# Patient Record
Sex: FEMALE | Race: WHITE | NOT HISPANIC OR LATINO | Employment: OTHER | URBAN - METROPOLITAN AREA
[De-identification: names, ages, dates, MRNs, and addresses within clinical notes are randomized per-mention and may not be internally consistent; named-entity substitution may affect disease eponyms.]

---

## 2019-12-02 SDOH — HEALTH STABILITY: MENTAL HEALTH: HOW OFTEN DO YOU HAVE A DRINK CONTAINING ALCOHOL?: NEVER

## 2019-12-02 ASSESSMENT — MIFFLIN-ST. JEOR: SCORE: 1339.99

## 2019-12-06 ENCOUNTER — ANESTHESIA EVENT (OUTPATIENT)
Dept: SURGERY | Facility: AMBULATORY SURGERY CENTER | Age: 34
End: 2019-12-06

## 2019-12-09 ENCOUNTER — HOSPITAL ENCOUNTER (OUTPATIENT)
Facility: AMBULATORY SURGERY CENTER | Age: 34
Discharge: HOME | End: 2019-12-09
Attending: PLASTIC SURGERY | Admitting: PLASTIC SURGERY

## 2019-12-09 ENCOUNTER — ANESTHESIA (OUTPATIENT)
Dept: SURGERY | Facility: AMBULATORY SURGERY CENTER | Age: 34
End: 2019-12-09

## 2019-12-09 VITALS
HEART RATE: 98 BPM | WEIGHT: 135 LBS | DIASTOLIC BLOOD PRESSURE: 78 MMHG | OXYGEN SATURATION: 100 % | TEMPERATURE: 97.4 F | HEIGHT: 67 IN | BODY MASS INDEX: 21.19 KG/M2 | SYSTOLIC BLOOD PRESSURE: 126 MMHG | RESPIRATION RATE: 11 BRPM

## 2019-12-09 DIAGNOSIS — R52 PAIN: Primary | ICD-10-CM

## 2019-12-09 DIAGNOSIS — R11.0 NAUSEA AFTER ANESTHESIA, INITIAL ENCOUNTER: ICD-10-CM

## 2019-12-09 DIAGNOSIS — B99.9 INFECTION: ICD-10-CM

## 2019-12-09 DIAGNOSIS — T88.59XA NAUSEA AFTER ANESTHESIA, INITIAL ENCOUNTER: ICD-10-CM

## 2019-12-09 LAB — HCG UR QL: NEGATIVE

## 2019-12-09 PROCEDURE — 81025 URINE PREGNANCY TEST: CPT | Performed by: ANESTHESIOLOGY

## 2019-12-09 PROCEDURE — 36000140 ZZH SURGERY LEVEL COSMETIC 120 MIN

## 2019-12-09 PROCEDURE — G8916 PT W IV AB GIVEN ON TIME: HCPCS

## 2019-12-09 PROCEDURE — L8600 IMPLANT BREAST SILICONE/EQ: HCPCS

## 2019-12-09 PROCEDURE — G8907 PT DOC NO EVENTS ON DISCHARG: HCPCS

## 2019-12-09 DEVICE — IMPLANTABLE DEVICE: Type: IMPLANTABLE DEVICE | Site: BREAST | Status: FUNCTIONAL

## 2019-12-09 RX ORDER — PROPOFOL 10 MG/ML
INJECTION, EMULSION INTRAVENOUS CONTINUOUS PRN
Status: DISCONTINUED | OUTPATIENT
Start: 2019-12-09 | End: 2019-12-09

## 2019-12-09 RX ORDER — CEFAZOLIN SODIUM 2 G/100ML
2 INJECTION, SOLUTION INTRAVENOUS
Status: COMPLETED | OUTPATIENT
Start: 2019-12-09 | End: 2019-12-09

## 2019-12-09 RX ORDER — ONDANSETRON 4 MG/1
8 TABLET, ORALLY DISINTEGRATING ORAL EVERY 8 HOURS PRN
Qty: 4 TABLET | Refills: 0
Start: 2019-12-09

## 2019-12-09 RX ORDER — FENTANYL CITRATE 50 UG/ML
INJECTION, SOLUTION INTRAMUSCULAR; INTRAVENOUS PRN
Status: DISCONTINUED | OUTPATIENT
Start: 2019-12-09 | End: 2019-12-09

## 2019-12-09 RX ORDER — OXYCODONE AND ACETAMINOPHEN 5; 325 MG/1; MG/1
2 TABLET ORAL
Status: DISCONTINUED | OUTPATIENT
Start: 2019-12-09 | End: 2019-12-10 | Stop reason: HOSPADM

## 2019-12-09 RX ORDER — SODIUM CHLORIDE, SODIUM LACTATE, POTASSIUM CHLORIDE, CALCIUM CHLORIDE 600; 310; 30; 20 MG/100ML; MG/100ML; MG/100ML; MG/100ML
INJECTION, SOLUTION INTRAVENOUS CONTINUOUS
Status: DISCONTINUED | OUTPATIENT
Start: 2019-12-09 | End: 2019-12-10 | Stop reason: HOSPADM

## 2019-12-09 RX ORDER — OXYCODONE HYDROCHLORIDE 5 MG/1
5 TABLET ORAL EVERY 4 HOURS PRN
Status: DISCONTINUED | OUTPATIENT
Start: 2019-12-09 | End: 2019-12-10 | Stop reason: HOSPADM

## 2019-12-09 RX ORDER — ONDANSETRON 4 MG/1
4 TABLET, ORALLY DISINTEGRATING ORAL
Status: DISCONTINUED | OUTPATIENT
Start: 2019-12-09 | End: 2019-12-10 | Stop reason: HOSPADM

## 2019-12-09 RX ORDER — HYDROXYZINE PAMOATE 25 MG/1
25 CAPSULE ORAL EVERY 6 HOURS PRN
Qty: 30 CAPSULE | Refills: 0
Start: 2019-12-09

## 2019-12-09 RX ORDER — ONDANSETRON 2 MG/ML
INJECTION INTRAMUSCULAR; INTRAVENOUS PRN
Status: DISCONTINUED | OUTPATIENT
Start: 2019-12-09 | End: 2019-12-09

## 2019-12-09 RX ORDER — FENTANYL CITRATE 50 UG/ML
25-50 INJECTION, SOLUTION INTRAMUSCULAR; INTRAVENOUS
Status: DISCONTINUED | OUTPATIENT
Start: 2019-12-09 | End: 2019-12-10 | Stop reason: HOSPADM

## 2019-12-09 RX ORDER — ONDANSETRON 4 MG/1
4 TABLET, ORALLY DISINTEGRATING ORAL EVERY 30 MIN PRN
Status: DISCONTINUED | OUTPATIENT
Start: 2019-12-09 | End: 2019-12-10 | Stop reason: HOSPADM

## 2019-12-09 RX ORDER — ACETAMINOPHEN 325 MG/1
975 TABLET ORAL ONCE
Status: COMPLETED | OUTPATIENT
Start: 2019-12-09 | End: 2019-12-09

## 2019-12-09 RX ORDER — ONDANSETRON 2 MG/ML
4 INJECTION INTRAMUSCULAR; INTRAVENOUS EVERY 30 MIN PRN
Status: DISCONTINUED | OUTPATIENT
Start: 2019-12-09 | End: 2019-12-10 | Stop reason: HOSPADM

## 2019-12-09 RX ORDER — BUPIVACAINE HYDROCHLORIDE AND EPINEPHRINE 2.5; 5 MG/ML; UG/ML
INJECTION, SOLUTION INFILTRATION; PERINEURAL PRN
Status: DISCONTINUED | OUTPATIENT
Start: 2019-12-09 | End: 2019-12-09 | Stop reason: HOSPADM

## 2019-12-09 RX ORDER — DEXAMETHASONE SODIUM PHOSPHATE 4 MG/ML
10 INJECTION, SOLUTION INTRA-ARTICULAR; INTRALESIONAL; INTRAMUSCULAR; INTRAVENOUS; SOFT TISSUE
Status: COMPLETED | OUTPATIENT
Start: 2019-12-09 | End: 2019-12-09

## 2019-12-09 RX ORDER — DEXAMETHASONE SODIUM PHOSPHATE 4 MG/ML
INJECTION, SOLUTION INTRA-ARTICULAR; INTRALESIONAL; INTRAMUSCULAR; INTRAVENOUS; SOFT TISSUE PRN
Status: DISCONTINUED | OUTPATIENT
Start: 2019-12-09 | End: 2019-12-09

## 2019-12-09 RX ORDER — CEPHALEXIN 500 MG/1
500 CAPSULE ORAL 2 TIMES DAILY
Qty: 28 CAPSULE | Refills: 0
Start: 2019-12-09 | End: 2019-12-16

## 2019-12-09 RX ORDER — OXYCODONE AND ACETAMINOPHEN 5; 325 MG/1; MG/1
1-2 TABLET ORAL EVERY 4 HOURS PRN
Qty: 16 TABLET | Refills: 0
Start: 2019-12-09

## 2019-12-09 RX ORDER — GABAPENTIN 300 MG/1
300 CAPSULE ORAL ONCE
Status: COMPLETED | OUTPATIENT
Start: 2019-12-09 | End: 2019-12-09

## 2019-12-09 RX ORDER — HYDROMORPHONE HYDROCHLORIDE 1 MG/ML
.3-.5 INJECTION, SOLUTION INTRAMUSCULAR; INTRAVENOUS; SUBCUTANEOUS EVERY 10 MIN PRN
Status: DISCONTINUED | OUTPATIENT
Start: 2019-12-09 | End: 2019-12-10 | Stop reason: HOSPADM

## 2019-12-09 RX ORDER — DIAZEPAM 10 MG
10 TABLET ORAL EVERY 12 HOURS PRN
Status: DISCONTINUED | OUTPATIENT
Start: 2019-12-09 | End: 2019-12-10 | Stop reason: HOSPADM

## 2019-12-09 RX ORDER — LIDOCAINE HYDROCHLORIDE 20 MG/ML
INJECTION, SOLUTION INFILTRATION; PERINEURAL PRN
Status: DISCONTINUED | OUTPATIENT
Start: 2019-12-09 | End: 2019-12-09

## 2019-12-09 RX ORDER — PROPOFOL 10 MG/ML
INJECTION, EMULSION INTRAVENOUS PRN
Status: DISCONTINUED | OUTPATIENT
Start: 2019-12-09 | End: 2019-12-09

## 2019-12-09 RX ORDER — MEPERIDINE HYDROCHLORIDE 25 MG/ML
12.5 INJECTION INTRAMUSCULAR; INTRAVENOUS; SUBCUTANEOUS
Status: DISCONTINUED | OUTPATIENT
Start: 2019-12-09 | End: 2019-12-10 | Stop reason: HOSPADM

## 2019-12-09 RX ORDER — NALOXONE HYDROCHLORIDE 0.4 MG/ML
.1-.4 INJECTION, SOLUTION INTRAMUSCULAR; INTRAVENOUS; SUBCUTANEOUS
Status: DISCONTINUED | OUTPATIENT
Start: 2019-12-09 | End: 2019-12-10 | Stop reason: HOSPADM

## 2019-12-09 RX ORDER — HYDROXYZINE HYDROCHLORIDE 25 MG/1
25 TABLET, FILM COATED ORAL
Status: DISCONTINUED | OUTPATIENT
Start: 2019-12-09 | End: 2019-12-10 | Stop reason: HOSPADM

## 2019-12-09 RX ORDER — LIDOCAINE 40 MG/G
CREAM TOPICAL
Status: DISCONTINUED | OUTPATIENT
Start: 2019-12-09 | End: 2019-12-10 | Stop reason: HOSPADM

## 2019-12-09 RX ADMIN — GABAPENTIN 300 MG: 300 CAPSULE ORAL at 13:03

## 2019-12-09 RX ADMIN — CEFAZOLIN SODIUM 2 G: 2 INJECTION, SOLUTION INTRAVENOUS at 13:39

## 2019-12-09 RX ADMIN — FENTANYL CITRATE 50 MCG: 50 INJECTION, SOLUTION INTRAMUSCULAR; INTRAVENOUS at 13:51

## 2019-12-09 RX ADMIN — SODIUM CHLORIDE, SODIUM LACTATE, POTASSIUM CHLORIDE, CALCIUM CHLORIDE: 600; 310; 30; 20 INJECTION, SOLUTION INTRAVENOUS at 15:07

## 2019-12-09 RX ADMIN — DEXAMETHASONE SODIUM PHOSPHATE 10 MG: 4 INJECTION, SOLUTION INTRA-ARTICULAR; INTRALESIONAL; INTRAMUSCULAR; INTRAVENOUS; SOFT TISSUE at 13:38

## 2019-12-09 RX ADMIN — FENTANYL CITRATE 50 MCG: 50 INJECTION, SOLUTION INTRAMUSCULAR; INTRAVENOUS at 16:26

## 2019-12-09 RX ADMIN — FENTANYL CITRATE 25 MCG: 50 INJECTION, SOLUTION INTRAMUSCULAR; INTRAVENOUS at 15:26

## 2019-12-09 RX ADMIN — ONDANSETRON 4 MG: 2 INJECTION INTRAMUSCULAR; INTRAVENOUS at 15:51

## 2019-12-09 RX ADMIN — OXYCODONE HYDROCHLORIDE 5 MG: 5 TABLET ORAL at 16:28

## 2019-12-09 RX ADMIN — ACETAMINOPHEN 975 MG: 325 TABLET ORAL at 13:03

## 2019-12-09 RX ADMIN — PROPOFOL 50 MG: 10 INJECTION, EMULSION INTRAVENOUS at 13:40

## 2019-12-09 RX ADMIN — PROPOFOL 200 MCG/KG/MIN: 10 INJECTION, EMULSION INTRAVENOUS at 13:38

## 2019-12-09 RX ADMIN — FENTANYL CITRATE 25 MCG: 50 INJECTION, SOLUTION INTRAMUSCULAR; INTRAVENOUS at 13:43

## 2019-12-09 RX ADMIN — LIDOCAINE HYDROCHLORIDE 60 MG: 20 INJECTION, SOLUTION INFILTRATION; PERINEURAL at 13:34

## 2019-12-09 RX ADMIN — SODIUM CHLORIDE, SODIUM LACTATE, POTASSIUM CHLORIDE, CALCIUM CHLORIDE: 600; 310; 30; 20 INJECTION, SOLUTION INTRAVENOUS at 13:27

## 2019-12-09 RX ADMIN — FENTANYL CITRATE 50 MCG: 50 INJECTION, SOLUTION INTRAMUSCULAR; INTRAVENOUS at 16:30

## 2019-12-09 RX ADMIN — CEFAZOLIN SODIUM 1 G: 2 INJECTION, SOLUTION INTRAVENOUS at 15:39

## 2019-12-09 RX ADMIN — Medication 10 MG: at 13:58

## 2019-12-09 RX ADMIN — FENTANYL CITRATE 25 MCG: 50 INJECTION, SOLUTION INTRAMUSCULAR; INTRAVENOUS at 13:34

## 2019-12-09 RX ADMIN — FENTANYL CITRATE 25 MCG: 50 INJECTION, SOLUTION INTRAMUSCULAR; INTRAVENOUS at 14:58

## 2019-12-09 RX ADMIN — FENTANYL CITRATE 25 MCG: 50 INJECTION, SOLUTION INTRAMUSCULAR; INTRAVENOUS at 14:48

## 2019-12-09 RX ADMIN — PROPOFOL 200 MG: 10 INJECTION, EMULSION INTRAVENOUS at 13:34

## 2019-12-09 RX ADMIN — FENTANYL CITRATE 25 MCG: 50 INJECTION, SOLUTION INTRAMUSCULAR; INTRAVENOUS at 15:15

## 2019-12-09 NOTE — ANESTHESIA CARE TRANSFER NOTE
Patient: Joleen Posey    Procedure(s):  AUGMENTATION, BREAST, BILATERAL  LIPOSUCTION pre axilla, bilateral    Diagnosis: * No pre-op diagnosis entered *  Diagnosis Additional Information: No value filed.    Anesthesia Type:   General     Note:  Airway :Face Mask  Patient transferred to:PACU  Handoff Report: Identifed the Patient, Identified the Reponsible Provider, Reviewed the pertinent medical history, Discussed the surgical course, Reviewed Intra-OP anesthesia mangement and issues during anesthesia, Set expectations for post-procedure period and Allowed opportunity for questions and acknowledgement of understanding      Vitals: (Last set prior to Anesthesia Care Transfer)    CRNA VITALS  12/9/2019 1530 - 12/9/2019 1600      12/9/2019             Pulse:  109    SpO2:  100 %                Electronically Signed By: GENEVIEVE Jay CRNA  December 9, 2019  4:00 PM

## 2019-12-09 NOTE — ANESTHESIA POSTPROCEDURE EVALUATION
Anesthesia POST Procedure Evaluation    Patient: Joleen Posey   MRN:     [id number] Gender:   female   Age:    34 year old :      1985        Preoperative Diagnosis: * No pre-op diagnosis entered *   Procedure(s):  AUGMENTATION, BREAST, BILATERAL  LIPOSUCTION pre axilla, bilateral   Postop Comments: No value filed.       Anesthesia Type:  Not documented  General    Reportable Event: NO     PAIN: Uncomplicated   Sign Out status: Comfortable, Well controlled pain     PONV: No PONV   Sign Out status:  No Nausea or Vomiting     Neuro/Psych: Uneventful perioperative course   Sign Out Status: Preoperative baseline; Age appropriate mentation     Airway/Resp.: Uneventful perioperative course   Sign Out Status: Non labored breathing, age appropriate RR; Resp. Status within EXPECTED Parameters     CV: Uneventful perioperative course   Sign Out status: Appropriate BP and perfusion indices; Appropriate HR/Rhythm     Disposition:   Sign Out in:  PACU  Disposition:  Phase II; Home  Recovery Course: Uneventful  Follow-Up: Not required           Last Anesthesia Record Vitals:  CRNA VITALS  2019 1530 - 2019 1620      2019             Pulse:  109    SpO2:  100 %          Last PACU Vitals:  Vitals Value Taken Time   /85 2019  4:15 PM   Temp 97.1  F (36.2  C) 2019  3:54 PM   Pulse 104 2019  4:15 PM   Resp 11 2019  4:18 PM   SpO2 100 % 2019  4:18 PM   Temp src     NIBP     Pulse     SpO2     Resp     Temp     Ht Rate     Temp 2     Vitals shown include unvalidated device data.      Electronically Signed By: Edmundo Torres MD, 2019, 4:20 PM

## 2019-12-09 NOTE — OR NURSING
I aksed pt x 2 if she had contacts in her eyes as her eyes are a very brilliant blue color she denied having contacts in x2

## 2019-12-09 NOTE — DISCHARGE INSTRUCTIONS
[facility]  Same-Day Surgery   Adult Discharge Orders & Instructions   For 24 hours after surgery  1. Get plenty of rest.  A responsible adult must stay with you for at least 24 hours after you leave the hospital.   2. Do not drive or use heavy equipment.  If you have weakness or tingling, don't drive or use heavy equipment until this feeling goes away.  3. Do not drink alcohol.  4. Avoid strenuous or risky activities.  Ask for help when climbing stairs.   5. You may feel lightheaded.  IF so, sit for a few minutes before standing.  Have someone help you get up.   6. If you have nausea (feel sick to your stomach): Drink only clear liquids such as apple juice, ginger ale, broth or 7-Up.  Rest may also help.  Be sure to drink enough fluids.  Move to a regular diet as you feel able.  7. You may have a slight fever. Call the doctor if your fever is over 100 F (37.7 C) (taken under the tongue) or lasts longer than 24 hours.  8. You may have a dry mouth, a sore throat, muscle aches or trouble sleeping.  These should go away after 24 hours.  9. Do not make important or legal decisions.   Call your doctor for any of the followin.  Signs of infection (fever, growing tenderness at the surgery site, a large amount of drainage or bleeding, severe pain, foul-smelling drainage, redness, swelling).    2. It has been over 8 to 10 hours since surgery and you are still not able to urinate (pass water).    3.  Headache for over 24 hours.      To contact Dr Raymond call:    [phone] - Day  [phone] - After hours pager

## 2019-12-09 NOTE — ANESTHESIA PREPROCEDURE EVALUATION
Anesthesia Pre-Procedure Evaluation    Patient: Joleen Posey   MRN:     [id number] Gender:   female   Age:    34 year old :      1985        Preoperative Diagnosis: * No pre-op diagnosis entered *   Procedure(s):  AUGMENTATION, BREAST, BILATERAL  LIPOSUCTION pre axilla     History reviewed. No pertinent past medical history.   History reviewed. No pertinent surgical history.       Anesthesia Evaluation     . Pt has had prior anesthetic. Type: General    No history of anesthetic complications          ROS/MED HX    ENT/Pulmonary:  - neg pulmonary ROS     Neurologic:  - neg neurologic ROS     Cardiovascular:  - neg cardiovascular ROS       METS/Exercise Tolerance:     Hematologic:  - neg hematologic  ROS       Musculoskeletal:  - neg musculoskeletal ROS       GI/Hepatic:  - neg GI/hepatic ROS       Renal/Genitourinary:  - ROS Renal section negative       Endo:  - neg endo ROS       Psychiatric:  - neg psychiatric ROS       Infectious Disease:  - neg infectious disease ROS       Malignancy:      - no malignancy   Other:    - neg other ROS                     PHYSICAL EXAM:   Mental Status/Neuro: A/A/O   Airway: Facies: Feasible  Mallampati: I  Mouth/Opening: Full  TM distance: > 6 cm  Neck ROM: Full   Respiratory: Auscultation: CTAB     Resp. Rate: Normal     Resp. Effort: Normal      CV: Rhythm: Regular  Rate: Age appropriate  Heart: Normal Sounds  Edema: None   Comments:      Dental: Normal Dentition                LABS:  CBC: No results found for: WBC, HGB, HCT, PLT  BMP: No results found for: NA, POTASSIUM, CHLORIDE, CO2, BUN, CR, GLC  COAGS: No results found for: PTT, INR, FIBR  POC:   Lab Results   Component Value Date    HCG Negative 2019     OTHER: No results found for: PH, LACT, A1C, KATHLEEN, PHOS, MAG, ALBUMIN, PROTTOTAL, ALT, AST, GGT, ALKPHOS, BILITOTAL, BILIDIRECT, LIPASE, AMYLASE, JENI, TSH, T4, T3, CRP, SED     Preop Vitals    BP Readings from Last 3 Encounters:   19 109/71    Pulse  "Readings from Last 3 Encounters:   No data found for Pulse      Resp Readings from Last 3 Encounters:   12/09/19 18    SpO2 Readings from Last 3 Encounters:   12/09/19 98%      Temp Readings from Last 1 Encounters:   12/09/19 98.4  F (36.9  C)    Ht Readings from Last 1 Encounters:   12/02/19 1.702 m (5' 7\")      Wt Readings from Last 1 Encounters:   12/02/19 61.2 kg (135 lb)    Estimated body mass index is 21.14 kg/m  as calculated from the following:    Height as of this encounter: 1.702 m (5' 7\").    Weight as of this encounter: 61.2 kg (135 lb).     LDA:  Airway - Adult/Peds laryngeal mask airway (Active)   Number of days: 0        Assessment:   ASA SCORE: 1      Smoking Status:  Non-Smoker/Unknown   NPO Status: NPO Appropriate     Plan:   Anes. Type:  General   Pre-Medication: None   Induction:  IV (Standard)   Airway: LMA   Access/Monitoring: PIV   Maintenance: TIVA     Postop Plan:   Postop Pain: Opioids  Postop Sedation/Airway: Not planned  Disposition: Outpatient     PONV Management:   Adult Risk Factors: Female, Non-Smoker, Postop Opioids   Prevention: Ondansetron, Dexamethasone, No Volatiles     CONSENT: Direct conversation   Plan and risks discussed with: Patient   Blood Products: Consent Deferred (Minimal Blood Loss)                   Edmundo Torres MD  "

## 2019-12-09 NOTE — BRIEF OP NOTE
[facility] Brief Operative Note    Pre-operative diagnosis: hypomastia   Post-operative diagnosis same   Procedure: Procedure(s):  AUGMENTATION, BREAST, BILATERAL  LIPOSUCTION pre axilla   Surgeon(s): Surgeon(s) and Role:     * Chriss Musa MD - Primary   Estimated blood loss: minimal   Specimens: none   Findings: none   Assist: none  Complications: none  Condition: extubated and stable to PAR    Chriss Musa MD

## 2020-01-04 NOTE — OP NOTE
Procedure Date: 12/09/2019      PREOPERATIVE DIAGNOSES:   1.  Bilateral hypomastia.   2.  Grade 2 breast ptosis.   3.  History of breast augmentation in [place], [place] by Gavino Doshi MD fellow of the [employer], 06/30/2008, removal and replacement and enlargement of her breast implants by Gavino Doshi MD on 04/20/2019, and removal of her breast implants after saline implant deflation on 10/19/2015.      PROCEDURE:  Bilateral augmentation mammoplasty through an inframammary fold incision with implant in a submuscular position.      IMPLANTS:  Left breast implant:  Morenci 650 mL smooth round ultra-high profile Morenci MemoryGel breast implant, reference number [id number], lot number [id number], serial number [id number].   Right breast implant:  Morenci 650 mL smooth round ultra-high profile Morenci MemoryGel breast implant, reference number [id number], lot number is [id number],  serial number [id number].      SURGEON:  Chriss Musa MD      ANESTHETIC:  General utilizing a laryngeal mask airway.      INDICATIONS:  The patient is a very pleasant and attractive 34-year-old female with a complicated history of previous breast surgery.  At age 23, the patient underwent saline breast augmentation by Gavino Doshi MD, [facility], in [place], [place].  The patient sought our care in 11/2019.  Since the patient had 3 operations performed by Gavino Doshi MD from [place], [place], we obtained authorization for release of medical information from [facility].  Notes arrived for my review.  The patient's first operation dated 06/30/2018 states that Dr. Doshi performed a transaxillary subfascial breast augmentation. As a board Certified  plastic surgeon, I do not   I believe have seen this operation described in the Plastic Surgery literature.  Subfascial breast augmentations have been described.  This would be an extreme difficult operation to be performed through a transaxillary approach as I do not know the  instrumentations involved for such an operation.  In further reviewing the notes, the patient's initial breast augmentation on 06/30/2008 utilized Fluvanna smooth round moderate plus profile implants.  These with Fluvanna style 2000 implants which is a 375 mL implant and they were filled to 385 mL on the left and 380 mL right.  The patient was taken to the operating room for removal and replacement and upsizing of her breast implant on 04/20/2009.  The notes received from Dr. Doshi's office indicates that these implants are a Fluvanna smooth round high profile saline breast implant; style 3000 with a nominal fill volume of 560 mL.  They were filled to 575 mL on the left and 570 mL on the right.  The patient experienced deflation of her breast implant in the autumn of 2015.  The patient elected to have her saline breast implants removed.  This operation for removal of her saline breast implants was performed on 10/19/2015 by Gavino Doshi MD.      The patient saw us in consultation regarding breast augmentation.  She was told that she does have breast ptosis and I recommended a Benelli or periareolar augmentation mastopexy.  The patient very much desired to avoid the circumareolar incision around the nipple-areolar complex necessary for treatment of the ptosis as is custom in a Benelli augmentation mastopexy.  The patient desired to have larger breasts and opted to undergo a submuscular breast augmentation and see if she would need to lift as a secondary procedure.  I agreed that it is possible a larger breast implant may develop a skin envelope and that she may find this to be satisfactory, though I did caution her that I felt the breast lift was in her best interest.  The patient was counseled as to the advantages and disadvantages of saline versus silicone gel breast implants.  She was told that saline breast implants have the inherent advantage that the fill material being saline is resorbed should the implant ever  fail.  Saline breast implants do not require MRI surveillance.  Saline breast implants also have a slightly lower rate of capsular contracture compared with silicone gel breast implants.  The patient understands that silicone gel breast implants have a lessened tendency towards rippling and wrinkling compared to saline breast implants due to the gel nature of the fill material and its viscosity.  There is a phenomenon with silicone gel breast implants known as silent rupture where the implant may fail and neither the surgeon nor patient are aware of this.  I recommend MRI surveillance for silent rupture at 8-10 years postoperatively.  The patient understands that breast implants are not a lifetime medical device and she can expect to have them replaced within her lifetime.  The current generation of Sientra HSC gel breast implants do carry a lifetime warranty.      The patient understands there is a phenomenon of capsular contracture where the body may form an aggressive scar capsule around the breast implant.  Capsular contracture in my practice is between 2% and 3% with implants placed in a submuscular position through an inframammary fold incision as would be the case in this patient.  The patient was told that there are 4 maneuvers I would utilize to diminish her chance of capsular contracture.  The first is submuscular breast implant placement.  Submuscular breast implant placement confers a 3-4-fold decrease in rate of capsular contracture compared with subglandular breast augmentation.  Subglandular breast augmentation carries a capsular contracture in the 15% to 19% and this can be lowered to the 3% to 4% range when placing the implant in a submuscular position.  Use of inframammary fold incision for placement of the breast implant has been shown to confer a 10-fold reduction of capsular contracture compared with the use of a periareolar incision.  This was shown in clinical studies performed by Maged  MD Janet from [facility].  In his study, the rate of capsular contracture through an inframammary fold incision was 0.59% where his capsular contracture rate was 9.5% when a periareolar incision was used.  The use of breast implant pocket irrigations consisting of Ancef, gentamicin, bacitracin has been shown to decrease capsule contracture in clinical studies performed by Pierre Pérez MD from [facility].  Last maneuver I utilize to diminish the chance of capsular contracture is the use of an implant that is iNPLANT funnel for implant placement.  The funnel allows for no-touch technique which theoretically should decrease the chance of developing biofilm and, therefore, capsular contracture.  The patient understands that she should tell her mammographer she has breast implants and age appropriate.  Special mammographic views known as Malik views are used to best visualize the breasts following augmentation mammoplasty.  The patient was told there is a condition known as breast implant associated lymphoma.  Between 4 and 5 hundred cases have been reported in the worldwide literature to date and every case has been shown to occur where patients and surgeons have chosen to use a textured implant.  In my practice dating to 1998, I have used only smooth implants in my practice.  No cases of breast implant associated lymphoma have occurred with the use of smoothed breast implants.      The patient understands that her incision will be inframammary fold.  She understands the risks of the operation include chance of capsule contracture, followed by implant malposition, asymmetry, hypertrophic scarring, and possible dissatisfaction with cosmetic outcome.  Other complications such as bleeding and infection are extremely rare in my practice.  I had 1 postoperative bleed and 1 single infection dating to 1994.  The patient was given the chance to ask questions and all were answered.   The patient provides her informed consent utilizing the [facility] consent form as well as our in-office consent form.      DESCRIPTION OF PROCEDURE AND FINDINGS:  In the preoperative holding area, the inframammary fold was marked and consent was obtained.  This consent is in addition to the consent obtained in our office at her preoperative evaluation.  The patient was taken to the operating room, placed in supine position on operating table.  A successful general anesthetic was then induced using a laryngeal mask airway.  The patient received 2 grams of Ancef and 10 mg of Decadron intravenously prior to commencing with surgery.  The patient's chest was then prepped and draped in routine sterile fashion.  The nipple-areolar complexes were covered with 3M Tegaderm dressing. A time-out was called at 1:51 p.m.  Incision was made at 1:52 p.m.      A 4.5 cm incision was made in the right inframammary fold and through this incision, dissection was carried down to the pectoralis muscle.  I dissected several centimeters above the inframammary fold on the clavipectoral fascia.  Then, utilizing a Nikolai retractor, I established a submuscular plane.  The muscle was partially released from 3 o'clock to 6 o'clock on the right side.  Intercostal perforating vessels were cauterized using the insulated bayonet style DeBakey type monopolar forceps as I came upon them.  Finger dissection was used laterally to avoid any traction of the fourth intercostal nerve.  Superiorly, dissection was made between the pectoralis major and pectoralis minor muscles using a uterine sound and gentle sweeping fashion.  A sizer was then placed and filled with air to 650 mL.  This gave nice shape to her breast and identified areas where further attenuation of medial fibers of pectoralis major was needed and this was accomplished surgically.  Right breast pocket was then irrigated with solution  consisting of 1 gram of Ancef, 80 mg of gentamicin and bacitracin and normal saline.  This was done in accordance with studies by Pierre Pérez MD from [facility] as a clinically proven means of diminishing the chance of capsular contracture.  The dissection pocket was then injected with 0.25% Marcaine and Kenalog for postoperative analgesia and for the anti-inflammatory properties of steroids.      Attention was then directed to the left side where the exact operation was performed.  On the left side, the muscle was partially released from 6 o'clock to 9 o'clock.  Again, a sizer was placed and filled with 650 mL of air which gave nice shape to her breast.  This also identified areas where further attenuation of the pectoralis major was needed and this was accomplished surgically.  The Ancef, gentamicin, bacitracin solution was then irrigated into left breast pocket as well.  I then injected the dissection pocket with 0.25% Marcaine and Kenalog to diminish postoperative discomfort and for the anti-inflammatory properties of steroids.      Empire 650 mL smooth round ultra-high profile Empire MemoryGel breast implants were chosen.  Their reference numbers and serial numbers can be found at the beginning of this operative report.  They were opened on the back table.  The implants were soaked in the Ancef, gentamicin and bacitracin solution.  At this point, all operative personnel changed their gloves.  An iNPLANT funnel was opened and the lubricant dispersed.  I then added the Ancef, gentamicin, bacitracin solution to the funnel.  The implant was then transferred from its sterile container to the funnel on the back table.  The zipper of the funnel was then zipped shut and trimmed to the appropriate size for a 600-650 mL implant.  The implant was then inserted into the right breast pocket utilizing a no-touch technique.  Closure consisted of 3-0 Vicryl sutures in the deep muscular layer.  Deep  subcutaneous tissue was closed using 4-0 PDS suture in buried interrupted fashion.  Deep dermal sutures were placed using 4-0 Monocryl suture in buried and interrupted fashion.  The skin was then run using 4-0 Prolene suture in running and buried continuous intracuticular fashion.  6-0 Prolene sutures were placed at each end of the incision for exact coaptation of the skin.      Attention was directed to the left side where the identical implant insertion was performed.  Once again, all operative personnel changed their gloves.  The iNPLANT funnel was then lubricated using  the antibiotic solution and Techni-Care.  The Vida 650 mL ultra-high profile implant was then transferred from its sterile container to the iNPLANT funnel.  The zipper was closed and the funnel was utilized to place the implant utilizing a no-touch technique.  Closure was identical from left to right.  Dressing consisted of Mastisol and 3M half-inch Steri-Strips over the inframammary fold incisions followed by a surgical bra.      ESTIMATED BLOOD LOSS:  Minimal.      COMPLICATIONS:  None.      CONDITION:  Stable to postanesthesia recovery.      OPERATIVE TIMES:  Total scheduled operating room time 2 hours and 30 minutes which equals 2 hours of paid surgical operative time plus 30 minutes of anesthesia/room time.  Actual total operating room time 2 hours and 26 minutes which equals 1 hour and 56 minutes of actual surgical operative time plus 30 minutes of anesthesia/room time.  It should be noted that the operation was 4 minutes underpaid surgical operative time.         ELIEL DALE MD             D: 2020   T: 2020   MT: [id number]      Name:     RADHA MONTERROSO   MRN:      [id number]        Account:        [id number]   :      1985           Procedure Date: 2019      Document: [id number]

## 2020-03-11 ENCOUNTER — HOSPITAL ENCOUNTER (OUTPATIENT)
Facility: AMBULATORY SURGERY CENTER | Age: 35
End: 2020-03-11
Attending: PLASTIC SURGERY | Admitting: PLASTIC SURGERY

## 2020-06-15 DIAGNOSIS — Z11.59 ENCOUNTER FOR SCREENING FOR OTHER VIRAL DISEASES: Primary | ICD-10-CM

## 2021-07-13 DIAGNOSIS — Z11.59 ENCOUNTER FOR SCREENING FOR OTHER VIRAL DISEASES: ICD-10-CM

## 2021-08-10 ENCOUNTER — LAB (OUTPATIENT)
Dept: URGENT CARE | Facility: URGENT CARE | Age: 36
End: 2021-08-10
Attending: PLASTIC SURGERY
Payer: COMMERCIAL

## 2021-08-10 PROCEDURE — U0003 INFECTIOUS AGENT DETECTION BY NUCLEIC ACID (DNA OR RNA); SEVERE ACUTE RESPIRATORY SYNDROME CORONAVIRUS 2 (SARS-COV-2) (CORONAVIRUS DISEASE [COVID-19]), AMPLIFIED PROBE TECHNIQUE, MAKING USE OF HIGH THROUGHPUT TECHNOLOGIES AS DESCRIBED BY CMS-2020-01-R: HCPCS | Performed by: PLASTIC SURGERY

## 2021-08-11 ENCOUNTER — ANESTHESIA EVENT (OUTPATIENT)
Dept: SURGERY | Facility: AMBULATORY SURGERY CENTER | Age: 36
End: 2021-08-11

## 2021-08-12 ENCOUNTER — HOSPITAL ENCOUNTER (OUTPATIENT)
Facility: AMBULATORY SURGERY CENTER | Age: 36
End: 2021-08-12
Attending: PLASTIC SURGERY | Admitting: PLASTIC SURGERY
Payer: COMMERCIAL

## 2021-08-12 ENCOUNTER — ANESTHESIA (OUTPATIENT)
Dept: SURGERY | Facility: AMBULATORY SURGERY CENTER | Age: 36
End: 2021-08-12

## 2021-08-12 VITALS
TEMPERATURE: 97.6 F | OXYGEN SATURATION: 100 % | SYSTOLIC BLOOD PRESSURE: 100 MMHG | HEIGHT: 67 IN | BODY MASS INDEX: 21.18 KG/M2 | DIASTOLIC BLOOD PRESSURE: 52 MMHG | WEIGHT: 134.92 LBS | RESPIRATION RATE: 16 BRPM

## 2021-08-12 DIAGNOSIS — T88.59XA NAUSEA AFTER ANESTHESIA, INITIAL ENCOUNTER: ICD-10-CM

## 2021-08-12 DIAGNOSIS — R52 PAIN: Primary | ICD-10-CM

## 2021-08-12 DIAGNOSIS — R11.0 NAUSEA AFTER ANESTHESIA, INITIAL ENCOUNTER: ICD-10-CM

## 2021-08-12 LAB — HCG UR QL: NEGATIVE

## 2021-08-12 PROCEDURE — 360N000057 HC SURGERY LEVEL COSMETIC 420 MIN

## 2021-08-12 PROCEDURE — G8907 PT DOC NO EVENTS ON DISCHARG: HCPCS

## 2021-08-12 PROCEDURE — 81025 URINE PREGNANCY TEST: CPT | Performed by: ANESTHESIOLOGY

## 2021-08-12 PROCEDURE — G8916 PT W IV AB GIVEN ON TIME: HCPCS

## 2021-08-12 PROCEDURE — 360N000058 HC SURGERY LEVEL COSMETIC EACH ADDL 30 MIN OVER QUOTE ESTIMATE

## 2021-08-12 PROCEDURE — 88305 TISSUE EXAM BY PATHOLOGIST: CPT | Mod: 59 | Performed by: PATHOLOGY

## 2021-08-12 RX ORDER — OXYCODONE AND ACETAMINOPHEN 5; 325 MG/1; MG/1
2 TABLET ORAL
Status: DISCONTINUED | OUTPATIENT
Start: 2021-08-12 | End: 2021-08-13 | Stop reason: HOSPADM

## 2021-08-12 RX ORDER — DIAZEPAM 10 MG/2ML
2.5 INJECTION, SOLUTION INTRAMUSCULAR; INTRAVENOUS
Status: DISCONTINUED | OUTPATIENT
Start: 2021-08-12 | End: 2021-08-13 | Stop reason: HOSPADM

## 2021-08-12 RX ORDER — ACETAMINOPHEN 325 MG/1
975 TABLET ORAL ONCE
Status: COMPLETED | OUTPATIENT
Start: 2021-08-12 | End: 2021-08-12

## 2021-08-12 RX ORDER — CEFAZOLIN SODIUM 2 G/100ML
2 INJECTION, SOLUTION INTRAVENOUS SEE ADMIN INSTRUCTIONS
Status: DISCONTINUED | OUTPATIENT
Start: 2021-08-12 | End: 2021-08-13 | Stop reason: HOSPADM

## 2021-08-12 RX ORDER — OXYCODONE HYDROCHLORIDE 5 MG/1
5-10 TABLET ORAL EVERY 4 HOURS PRN
Status: DISCONTINUED | OUTPATIENT
Start: 2021-08-12 | End: 2021-08-13 | Stop reason: HOSPADM

## 2021-08-12 RX ORDER — HYDROXYZINE HYDROCHLORIDE 25 MG/1
25 TABLET, FILM COATED ORAL
Status: DISCONTINUED | OUTPATIENT
Start: 2021-08-12 | End: 2021-08-13 | Stop reason: HOSPADM

## 2021-08-12 RX ORDER — SODIUM CHLORIDE, SODIUM LACTATE, POTASSIUM CHLORIDE, CALCIUM CHLORIDE 600; 310; 30; 20 MG/100ML; MG/100ML; MG/100ML; MG/100ML
INJECTION, SOLUTION INTRAVENOUS CONTINUOUS
Status: DISCONTINUED | OUTPATIENT
Start: 2021-08-12 | End: 2021-08-13 | Stop reason: HOSPADM

## 2021-08-12 RX ORDER — CEFAZOLIN SODIUM 2 G/100ML
2 INJECTION, SOLUTION INTRAVENOUS
Status: COMPLETED | OUTPATIENT
Start: 2021-08-12 | End: 2021-08-12

## 2021-08-12 RX ORDER — LIDOCAINE HYDROCHLORIDE 20 MG/ML
INJECTION, SOLUTION INFILTRATION; PERINEURAL PRN
Status: DISCONTINUED | OUTPATIENT
Start: 2021-08-12 | End: 2021-08-12

## 2021-08-12 RX ORDER — KETOROLAC TROMETHAMINE 30 MG/ML
30 INJECTION, SOLUTION INTRAMUSCULAR; INTRAVENOUS EVERY 6 HOURS PRN
Status: DISCONTINUED | OUTPATIENT
Start: 2021-08-12 | End: 2021-08-13 | Stop reason: HOSPADM

## 2021-08-12 RX ORDER — FENTANYL CITRATE 50 UG/ML
INJECTION, SOLUTION INTRAMUSCULAR; INTRAVENOUS PRN
Status: DISCONTINUED | OUTPATIENT
Start: 2021-08-12 | End: 2021-08-12

## 2021-08-12 RX ORDER — DEXAMETHASONE SODIUM PHOSPHATE 4 MG/ML
10 INJECTION, SOLUTION INTRA-ARTICULAR; INTRALESIONAL; INTRAMUSCULAR; INTRAVENOUS; SOFT TISSUE
Status: COMPLETED | OUTPATIENT
Start: 2021-08-12 | End: 2021-08-12

## 2021-08-12 RX ORDER — ONDANSETRON 2 MG/ML
4 INJECTION INTRAMUSCULAR; INTRAVENOUS EVERY 30 MIN PRN
Status: DISCONTINUED | OUTPATIENT
Start: 2021-08-12 | End: 2021-08-13 | Stop reason: HOSPADM

## 2021-08-12 RX ORDER — PROPOFOL 10 MG/ML
INJECTION, EMULSION INTRAVENOUS PRN
Status: DISCONTINUED | OUTPATIENT
Start: 2021-08-12 | End: 2021-08-12

## 2021-08-12 RX ORDER — CEFADROXIL 500 MG/1
500 CAPSULE ORAL EVERY 12 HOURS SCHEDULED
Status: DISCONTINUED | OUTPATIENT
Start: 2021-08-12 | End: 2021-08-13 | Stop reason: HOSPADM

## 2021-08-12 RX ORDER — ONDANSETRON 4 MG/1
4-8 TABLET, ORALLY DISINTEGRATING ORAL EVERY 8 HOURS PRN
Qty: 4 TABLET | Refills: 0 | Status: CANCELLED
Start: 2021-08-12

## 2021-08-12 RX ORDER — ONDANSETRON 4 MG/1
4 TABLET, ORALLY DISINTEGRATING ORAL
Status: DISCONTINUED | OUTPATIENT
Start: 2021-08-12 | End: 2021-08-13 | Stop reason: HOSPADM

## 2021-08-12 RX ORDER — FENTANYL CITRATE 50 UG/ML
25 INJECTION, SOLUTION INTRAMUSCULAR; INTRAVENOUS EVERY 5 MIN PRN
Status: DISCONTINUED | OUTPATIENT
Start: 2021-08-12 | End: 2021-08-13 | Stop reason: HOSPADM

## 2021-08-12 RX ORDER — LIDOCAINE 40 MG/G
CREAM TOPICAL
Status: DISCONTINUED | OUTPATIENT
Start: 2021-08-12 | End: 2021-08-13 | Stop reason: HOSPADM

## 2021-08-12 RX ORDER — PROPOFOL 10 MG/ML
INJECTION, EMULSION INTRAVENOUS CONTINUOUS PRN
Status: DISCONTINUED | OUTPATIENT
Start: 2021-08-12 | End: 2021-08-12

## 2021-08-12 RX ORDER — DIAZEPAM 5 MG
10 TABLET ORAL EVERY 12 HOURS PRN
Status: DISCONTINUED | OUTPATIENT
Start: 2021-08-12 | End: 2021-08-13 | Stop reason: HOSPADM

## 2021-08-12 RX ORDER — OXYCODONE AND ACETAMINOPHEN 5; 325 MG/1; MG/1
1 TABLET ORAL EVERY 4 HOURS PRN
Status: DISCONTINUED | OUTPATIENT
Start: 2021-08-12 | End: 2021-08-13 | Stop reason: HOSPADM

## 2021-08-12 RX ORDER — LABETALOL HYDROCHLORIDE 5 MG/ML
10 INJECTION, SOLUTION INTRAVENOUS
Status: DISCONTINUED | OUTPATIENT
Start: 2021-08-12 | End: 2021-08-13 | Stop reason: HOSPADM

## 2021-08-12 RX ORDER — COCAINE HYDROCHLORIDE 40 MG/ML
SOLUTION NASAL PRN
Status: DISCONTINUED | OUTPATIENT
Start: 2021-08-12 | End: 2021-08-12 | Stop reason: HOSPADM

## 2021-08-12 RX ORDER — BUPIVACAINE HYDROCHLORIDE AND EPINEPHRINE 2.5; 5 MG/ML; UG/ML
INJECTION, SOLUTION INFILTRATION; PERINEURAL PRN
Status: DISCONTINUED | OUTPATIENT
Start: 2021-08-12 | End: 2021-08-12 | Stop reason: HOSPADM

## 2021-08-12 RX ORDER — FENTANYL CITRATE 50 UG/ML
25-50 INJECTION, SOLUTION INTRAMUSCULAR; INTRAVENOUS EVERY 5 MIN PRN
Status: ACTIVE | OUTPATIENT
Start: 2021-08-12 | End: 2021-08-12

## 2021-08-12 RX ORDER — SCOLOPAMINE TRANSDERMAL SYSTEM 1 MG/1
1 PATCH, EXTENDED RELEASE TRANSDERMAL ONCE
Status: DISCONTINUED | OUTPATIENT
Start: 2021-08-12 | End: 2021-08-13 | Stop reason: HOSPADM

## 2021-08-12 RX ORDER — GINSENG 100 MG
CAPSULE ORAL PRN
Status: DISCONTINUED | OUTPATIENT
Start: 2021-08-12 | End: 2021-08-12 | Stop reason: HOSPADM

## 2021-08-12 RX ORDER — ONDANSETRON 4 MG/1
4 TABLET, ORALLY DISINTEGRATING ORAL EVERY 30 MIN PRN
Status: DISCONTINUED | OUTPATIENT
Start: 2021-08-12 | End: 2021-08-13 | Stop reason: HOSPADM

## 2021-08-12 RX ORDER — HYDROXYZINE PAMOATE 25 MG/1
25 CAPSULE ORAL EVERY 6 HOURS PRN
Qty: 30 CAPSULE | Refills: 0 | Status: CANCELLED
Start: 2021-08-12

## 2021-08-12 RX ORDER — ONDANSETRON 2 MG/ML
INJECTION INTRAMUSCULAR; INTRAVENOUS PRN
Status: DISCONTINUED | OUTPATIENT
Start: 2021-08-12 | End: 2021-08-12

## 2021-08-12 RX ORDER — ALBUTEROL SULFATE 0.83 MG/ML
2.5 SOLUTION RESPIRATORY (INHALATION) EVERY 4 HOURS PRN
Status: DISCONTINUED | OUTPATIENT
Start: 2021-08-12 | End: 2021-08-13 | Stop reason: HOSPADM

## 2021-08-12 RX ORDER — MEPERIDINE HYDROCHLORIDE 25 MG/ML
12.5 INJECTION INTRAMUSCULAR; INTRAVENOUS; SUBCUTANEOUS
Status: COMPLETED | OUTPATIENT
Start: 2021-08-12 | End: 2021-08-12

## 2021-08-12 RX ORDER — LIDOCAINE HYDROCHLORIDE AND EPINEPHRINE 10; 10 MG/ML; UG/ML
INJECTION, SOLUTION INFILTRATION; PERINEURAL PRN
Status: DISCONTINUED | OUTPATIENT
Start: 2021-08-12 | End: 2021-08-12 | Stop reason: HOSPADM

## 2021-08-12 RX ADMIN — SODIUM CHLORIDE, SODIUM LACTATE, POTASSIUM CHLORIDE, CALCIUM CHLORIDE: 600; 310; 30; 20 INJECTION, SOLUTION INTRAVENOUS at 13:21

## 2021-08-12 RX ADMIN — CEFAZOLIN SODIUM 2 G: 2 INJECTION, SOLUTION INTRAVENOUS at 07:03

## 2021-08-12 RX ADMIN — Medication 30 MG: at 07:14

## 2021-08-12 RX ADMIN — PROPOFOL 50 MG: 10 INJECTION, EMULSION INTRAVENOUS at 08:02

## 2021-08-12 RX ADMIN — FENTANYL CITRATE 25 MCG: 50 INJECTION, SOLUTION INTRAMUSCULAR; INTRAVENOUS at 15:33

## 2021-08-12 RX ADMIN — Medication 0.5 MG: at 11:36

## 2021-08-12 RX ADMIN — Medication 0.5 MG: at 10:17

## 2021-08-12 RX ADMIN — ACETAMINOPHEN 975 MG: 325 TABLET ORAL at 06:23

## 2021-08-12 RX ADMIN — ONDANSETRON 4 MG: 2 INJECTION INTRAMUSCULAR; INTRAVENOUS at 14:40

## 2021-08-12 RX ADMIN — MEPERIDINE HYDROCHLORIDE 12.5 MG: 25 INJECTION INTRAMUSCULAR; INTRAVENOUS; SUBCUTANEOUS at 15:53

## 2021-08-12 RX ADMIN — FENTANYL CITRATE 50 MCG: 50 INJECTION, SOLUTION INTRAMUSCULAR; INTRAVENOUS at 07:14

## 2021-08-12 RX ADMIN — MEPERIDINE HYDROCHLORIDE 12.5 MG: 25 INJECTION INTRAMUSCULAR; INTRAVENOUS; SUBCUTANEOUS at 15:39

## 2021-08-12 RX ADMIN — DEXAMETHASONE SODIUM PHOSPHATE 10 MG: 4 INJECTION, SOLUTION INTRA-ARTICULAR; INTRALESIONAL; INTRAMUSCULAR; INTRAVENOUS; SOFT TISSUE at 07:35

## 2021-08-12 RX ADMIN — SODIUM CHLORIDE, SODIUM LACTATE, POTASSIUM CHLORIDE, CALCIUM CHLORIDE: 600; 310; 30; 20 INJECTION, SOLUTION INTRAVENOUS at 06:57

## 2021-08-12 RX ADMIN — PROPOFOL 200 MCG/KG/MIN: 10 INJECTION, EMULSION INTRAVENOUS at 07:16

## 2021-08-12 RX ADMIN — FENTANYL CITRATE 25 MCG: 50 INJECTION, SOLUTION INTRAMUSCULAR; INTRAVENOUS at 15:52

## 2021-08-12 RX ADMIN — PROPOFOL 200 MG: 10 INJECTION, EMULSION INTRAVENOUS at 07:14

## 2021-08-12 RX ADMIN — SODIUM CHLORIDE, SODIUM LACTATE, POTASSIUM CHLORIDE, CALCIUM CHLORIDE: 600; 310; 30; 20 INJECTION, SOLUTION INTRAVENOUS at 14:58

## 2021-08-12 RX ADMIN — Medication 0.5 MG: at 13:23

## 2021-08-12 RX ADMIN — FENTANYL CITRATE 50 MCG: 50 INJECTION, SOLUTION INTRAMUSCULAR; INTRAVENOUS at 07:35

## 2021-08-12 RX ADMIN — FENTANYL CITRATE 25 MCG: 50 INJECTION, SOLUTION INTRAMUSCULAR; INTRAVENOUS at 15:58

## 2021-08-12 RX ADMIN — SCOLOPAMINE TRANSDERMAL SYSTEM 1 PATCH: 1 PATCH, EXTENDED RELEASE TRANSDERMAL at 07:06

## 2021-08-12 RX ADMIN — OXYCODONE HYDROCHLORIDE 5 MG: 5 TABLET ORAL at 15:51

## 2021-08-12 RX ADMIN — PROPOFOL 50 MG: 10 INJECTION, EMULSION INTRAVENOUS at 08:51

## 2021-08-12 RX ADMIN — CEFAZOLIN SODIUM 2 G: 2 INJECTION, SOLUTION INTRAVENOUS at 15:01

## 2021-08-12 RX ADMIN — Medication 20 MG: at 08:02

## 2021-08-12 RX ADMIN — LIDOCAINE HYDROCHLORIDE 60 MG: 20 INJECTION, SOLUTION INFILTRATION; PERINEURAL at 07:14

## 2021-08-12 RX ADMIN — CEFAZOLIN SODIUM 2 G: 2 INJECTION, SOLUTION INTRAVENOUS at 11:03

## 2021-08-12 RX ADMIN — Medication 0.5 MG: at 08:04

## 2021-08-12 RX ADMIN — FENTANYL CITRATE 25 MCG: 50 INJECTION, SOLUTION INTRAMUSCULAR; INTRAVENOUS at 15:40

## 2021-08-12 RX ADMIN — SODIUM CHLORIDE, SODIUM LACTATE, POTASSIUM CHLORIDE, CALCIUM CHLORIDE: 600; 310; 30; 20 INJECTION, SOLUTION INTRAVENOUS at 11:52

## 2021-08-12 RX ADMIN — SODIUM CHLORIDE, SODIUM LACTATE, POTASSIUM CHLORIDE, CALCIUM CHLORIDE: 600; 310; 30; 20 INJECTION, SOLUTION INTRAVENOUS at 08:32

## 2021-08-12 ASSESSMENT — MIFFLIN-ST. JEOR: SCORE: 1329.63

## 2021-08-12 NOTE — DISCHARGE INSTRUCTIONS
[facility]  Same-Day Surgery   Adult Discharge Orders & Instructions   For 24 hours after surgery  1. Get plenty of rest.  A responsible adult must stay with you for at least 24 hours after you leave the hospital.   2. Do not drive or use heavy equipment.  If you have weakness or tingling, don't drive or use heavy equipment until this feeling goes away.  3. Do not drink alcohol.  4. Avoid strenuous or risky activities.  Ask for help when climbing stairs.   5. You may feel lightheaded.  IF so, sit for a few minutes before standing.  Have someone help you get up.   6. If you have nausea (feel sick to your stomach): Drink only clear liquids such as apple juice, ginger ale, broth or 7-Up.  Rest may also help.  Be sure to drink enough fluids.  Move to a regular diet as you feel able.  7. You may have a slight fever. Call the doctor if your fever is over 100 F (37.7 C) (taken under the tongue) or lasts longer than 24 hours.  8. You may have a dry mouth, a sore throat, muscle aches or trouble sleeping.  These should go away after 24 hours.  9. Do not make important or legal decisions.   Call your doctor for any of the followin.  Signs of infection (fever, growing tenderness at the surgery site, a large amount of drainage or bleeding, severe pain, foul-smelling drainage, redness, swelling).    2. It has been over 8 to 10 hours since surgery and you are still not able to urinate (pass water).    3.  Headache for over 24 hours.    SCOPALAMINE Patch placed behind your ear for nausea relief.  Remove the patch in 24-26 hours.  Dispose in trash and wash hands carefully.     Information for Patients Discharging with a Transderm Scopolamine Patch       Dry mouth is a common side effect.    Drowsiness is another common side effect especially when combined with pain medication.  Please avoid activities that require mental alertness such as driving a car or making important legal decisions.    Since  Scopolamine can cause temporary dilation of the pupils and blurred vision if it comes in contact with the eyes; be sure to wash your hands thoroughly with soap and water immediately after handling the patch.   When you remove your patch, please stick it to a tissue or paper towel for disposal.      Remove the patch immediately and contact a physician in the unlikely event that you experience symptoms of acute glaucoma (pain and reddening of the eyes, accompanied by dilated pupils).  Remove the patch if you develop any difficulties urinating.  If you cannot urinate after removing your patch, please notify your surgeon.

## 2021-08-12 NOTE — BRIEF OP NOTE
[facility] Brief Operative Note    Pre-operative diagnosis: Breast ptosis and cosmetic nasal deformity   Post-operative diagnosis same   Procedure: Procedure(s):  RHINOPLASTY  Reaves pattern mastopexy   Surgeon(s): Surgeon(s) and Role:     * Chriss Musa MD - Primary   Estimated blood loss: 50ml    Specimens: Sent for gross pathology   Findings: none   Assist: none  Complications: none  Condition: extubated and stable to PAR    Chriss Musa MD

## 2021-08-12 NOTE — OR NURSING
Eye contacts removed in OR before induction per patient request. Placed in contact case with NAC[facility], sent to PACU  after procedure.  Arpita Patton Rn

## 2021-08-12 NOTE — ANESTHESIA PROCEDURE NOTES
Airway       Patient location during procedure: OR       Procedure Start/Stop Times: 8/12/2021 7:19 AM and 8/12/2021 7:20 AM  Staff -        CRNA: Naima Hay APRN CRNA       Performed By: anesthesiologist  Consent for Airway        Urgency: elective  Indications and Patient Condition       Indications for airway management: candida-procedural       Induction type:intravenous       Mask difficulty assessment: 2 - vent by mask + OA or adjuvant +/- NMBA    Final Airway Details       Final airway type: endotracheal airway       Successful airway: ETT - single, Oral and JERRELL  Endotracheal Airway Details        ETT size (mm): 7.0       Successful intubation technique: direct laryngoscopy       DL Blade Type: Steele 2       Grade View of Cords: 1       Adjucts: tooth guard       Position: Center       Measured from: lips       Secured at (cm): 21       Bite block used: None    Post intubation assessment        Placement verified by: capnometry, equal breath sounds and chest rise        Number of attempts at approach: 1       Number of other approaches attempted: 0       Secured with: commercial tube eller and plastic tape       Ease of procedure: easy       Dentition: Intact and Unchanged

## 2021-08-12 NOTE — ANESTHESIA PREPROCEDURE EVALUATION
Anesthesia Pre-Procedure Evaluation    Patient: Joleen Posey   MRN: [id number] : 1985        Preoperative Diagnosis: Encounter for cosmetic surgery [Z41.1]   Procedure : Procedure(s):  RHINOPLASTY  Reaves pattern mastopexy     History reviewed. No pertinent past medical history.   Past Surgical History:   Procedure Laterality Date     LIPOSUCTION (LOCATION) Bilateral 2019    Procedure: LIPOSUCTION pre axilla, bilateral;  Surgeon: Chriss Musa MD;  Location: [facility] OR     MAMMOPLASTY AUGMENTATION BILATERAL Bilateral 2019    Procedure: AUGMENTATION, BREAST, BILATERAL;  Surgeon: Chriss Musa MD;  Location: [facility] OR      Allergies   Allergen Reactions     Adhesive Tape Blisters     Steri Strips Blisters      Social History     Tobacco Use     Smoking status: Never Smoker     Smokeless tobacco: Never Used   Substance Use Topics     Alcohol use: Never      Wt Readings from Last 1 Encounters:   21 61.2 kg (134 lb 14.7 oz)        Anesthesia Evaluation            ROS/MED HX  ENT/Pulmonary:  - neg pulmonary ROS     Neurologic:  - neg neurologic ROS     Cardiovascular:  - neg cardiovascular ROS     METS/Exercise Tolerance:     Hematologic:  - neg hematologic  ROS     Musculoskeletal:  - neg musculoskeletal ROS     GI/Hepatic:  - neg GI/hepatic ROS     Renal/Genitourinary:  - neg Renal ROS     Endo:  - neg endo ROS     Psychiatric/Substance Use:  - neg psychiatric ROS     Infectious Disease:  - neg infectious disease ROS     Malignancy:  - neg malignancy ROS     Other:  - neg other ROS          Physical Exam    Airway  airway exam normal      Mallampati: II   TM distance: > 3 FB   Neck ROM: full   Mouth opening: > 3 cm    Respiratory Devices and Support         Dental  no notable dental history         Cardiovascular          Rhythm and rate: regular and normal     Pulmonary   pulmonary exam normal        breath sounds clear to auscultation           OUTSIDE LABS:  CBC: No results found for:  WBC, HGB, HCT, PLT  BMP: No results found for: NA, POTASSIUM, CHLORIDE, CO2, BUN, CR, GLC  COAGS: No results found for: PTT, INR, FIBR  POC:   Lab Results   Component Value Date    HCG Negative 08/12/2021     HEPATIC: No results found for: ALBUMIN, PROTTOTAL, ALT, AST, GGT, ALKPHOS, BILITOTAL, BILIDIRECT, JENI  OTHER: No results found for: PH, LACT, A1C, KATHLEEN, PHOS, MAG, LIPASE, AMYLASE, TSH, T4, T3, CRP, SED    Anesthesia Plan    ASA Status:  1   NPO Status:  NPO Appropriate    Anesthesia Type: General.     - Airway: ETT   Induction: Intravenous.   Maintenance: Balanced.        Consents    Anesthesia Plan(s) and associated risks, benefits, and realistic alternatives discussed. Questions answered and patient/representative(s) expressed understanding.     - Discussed with:  Patient      - Extended Intubation/Ventilatory Support Discussed: No.      - Patient is DNR/DNI Status: No    Use of blood products discussed: No .     Postoperative Care    Pain management: IV analgesics, Oral pain medications, Multi-modal analgesia.   PONV prophylaxis: Ondansetron (or other 5HT-3), Dexamethasone or Solumedrol, Background Propofol Infusion     Comments:                Cedric Phillips MD

## 2021-08-12 NOTE — ANESTHESIA CARE TRANSFER NOTE
Patient: Joleen Posey    Procedure(s):  RHINOPLASTY  Reaves pattern mastopexy    Diagnosis: Encounter for cosmetic surgery [Z41.1]  Diagnosis Additional Information: No value filed.    Anesthesia Type:   No value filed.     Note:    Oropharynx: oropharynx clear of all foreign objects  Level of Consciousness: awake  Oxygen Supplementation: face mask  Level of Supplemental Oxygen (L/min / FiO2): 6  Independent Airway: airway patency satisfactory and stable  Dentition: dentition unchanged  Vital Signs Stable: post-procedure vital signs reviewed and stable  Report to RN Given: handoff report given  Patient transferred to: PACU  Comments: Prior to extubation, oropharynx gently suctioned, awake extubation, good aeration, SpO2 100%, equal bilateral breath sounds.  Transported to PACU on oxygen 6 lpm.  Patient awake, alert, SpO2 100% in PACU.  No apparent anesthesia complications.    Handoff Report: Identifed the Patient, Identified the Reponsible Provider, Reviewed the pertinent medical history, Discussed the surgical course, Reviewed Intra-OP anesthesia mangement and issues during anesthesia, Set expectations for post-procedure period and Allowed opportunity for questions and acknowledgement of understanding      Vitals:  Vitals Value Taken Time   BP     Temp     Pulse     Resp     SpO2         Electronically Signed By: GENEVIEVE Perez CRNA  August 12, 2021  3:15 PM

## 2021-08-16 LAB
PATH REPORT.COMMENTS IMP SPEC: NORMAL
PATH REPORT.COMMENTS IMP SPEC: NORMAL
PATH REPORT.FINAL DX SPEC: NORMAL
PATH REPORT.GROSS SPEC: NORMAL
PATH REPORT.MICROSCOPIC SPEC OTHER STN: NORMAL
PATH REPORT.RELEVANT HX SPEC: NORMAL
PHOTO IMAGE: NORMAL

## 2021-08-30 NOTE — OP NOTE
Procedure Date: 08/12/2021    PREOPERATIVE DIAGNOSES:  1.  Cosmetic nasal deformity.  2.  Bilateral postpartum involution due to a pregnancy within the last year.  3.  Bilateral grade 3 breast ptosis.    POSTOPERATIVE DIAGNOSES:    1.  Cosmetic nasal deformity.  2.  Bilateral postpartum involution due to a pregnancy within the last year.  3.  Bilateral grade 3 breast ptosis.    OPERATION:  1.  Open septorhinoplasty, with removal of dorsal nasal hump, deprojection of the tip,  grafts, a columellar strut graft and a Elenita tip graft.  2.  Bilateral Wise pattern augmentation mastopexy.  The patient had previous breast augmentation in our office in 12/2019.  Subsequently the patient became pregnant, and her breasts became engorged when lactating.  After she finished breastfeeding, she had postpartum involution, and her breasts are now deflated and ptotic.  The patient now wishes to undergo a breast lift utilizing her own implants.      INDICATIONS:  The patient understands that in performing augmentation mastopexy we are doing 2 diametrically opposed things -- increasing the volume of the breasts while tightening the skin envelope.  There is a remote possibility of wound healing problems.  For this reason, we had her use DMSO on her skin over Adaptic through a spray bottle on twice-a-day basis for 1 week to improve capillary and arterial blood supply to the flaps and nipple areolar complexes.  The patient understands the incisions and resultant scars for a Wise pattern mastopexy including a periareolar incision, a vertical incision and incision in the inframammary fold.  The patient was shown these incisions and scars, both in the form of line drawings as well as photographs of other patients at various stages of wound healing including 8 weeks when erythema is maximal, and at 6 and 9 months as scars are maturing.  The patient was told there was a rare chance of infection and bleeding.  I have had one single  infection necessitating implant removal and 1 hematoma necessitating a return trip to the operating room and have performed this operation for 27 years. With regard to rhinoplasty, in our office, she communicated that she would like to have a reduction of her dorsal nasal hump and her tip made more refined.  In terms of technique, I emphasized the importance of performing  grafts as a means of achieving a natural-appearing dorsum and providing integrity in terms of the internal nasal valves.  I told her I would use a columellar strut graft to prevent a supratip deformity and maintain integrity of the tip contour.  She understands that she will require osteotomies to close the open roof deformity once I remove her dorsal nasal hump.   grafts are means of making a larger nose smaller, yet maintaining an intact internal nasal valve for breathing and to provide a natural appearance of the dorsum.  I told her I would likely use a Elenita type tip graft to gain definition to her tip.  I learned the use of the tip graft from Fernie Kwong MD from [place], [place].  She will have tape on her nose for approximately 1 week.  Due to the EXTREME ALLERGY TO STERI-STRIPS, ADHESIVE, OR THE COMBINATION OF BOTH, we will not be using a typical dorsal nasal splint.  We provided her with blue ice gel masks to decrease bruising of the periocular structures.  She will apply peas as a means of transmitting cold through the blue ice gel mask.  She was also provided with [place] in both capsule form and gel form and has been educated on its use and its ability to decrease bruising and swelling seen in rhinoplasty.  The patient was told that nationwide revision rate for rhinoplasty is 15%.  Through my use of an open septorhinoplasty technique, my revision rate is less than 1% in a career dating back to 1994.  Risks of the operation include chance of bleeding, infection, hematoma, seroma, septal hematoma, septal  perforation, asymmetry, and possible need for revisional surgery as well as possible dissatisfaction with cosmetic outcome.  The patient was given a chance to ask questions, and all were answered.  The patient provides her informed consent for the operation utilizing our in-office consent form and again the morning of surgery at [facility].    DESCRIPTION OF PROCEDURE AND FINDINGS:  The patient was taken to the operating room and placed on the operating table.  Successful general endotracheal anesthetic was induced using oral JERRELL tube, which was taped in the midline.  Attention was first directed to the breasts.  In the preoperative holding area, her breasts had been marked using a Wise pattern, placing the dura complex at 21 cm from the midclavicular line and in the midline of the breast.  The vertical limbs converge around the areola to maintain the most skin flap in terms of performing the Reaves pattern mastopexy and attaining closure.    The patient was taken to the operating room and placed in supine position on the operating table.  Successful general endotracheal anesthetic was then induced.  It should be noted the patient received 2 grams of Ancef and 10 mg of Decadron intravenously, and the Ancef was repeated every 2 hours throughout the operation.  The patient's chest was prepped and draped in routine sterile fashion.  A timeout was called.  An incision was made in the inframammary fold using a #15 blade approximately 6 cm in length.  I then dissected down to the pectoralis muscle.  Once the pectoralis muscle was identified, I used the 42 mm nipple areolar complex template to obtain a circular subcuticular pattern to the nipple-areolar complex.  I then deepithelialized the Wise pattern around the nipple-areolar complex.  The lower portion of the Reaves pattern was then discarded and sent to Pathology as ptotic breast tissue.  The right breast tissue weighed 124 grams.  The  inframammary fold was then inset using 3-0 PDS sutures, leaving a window for insertion of the nipple-areolar complex.  The vertical incision again was closed using 3-0 PDS sutures in the deep dermal layer.  I then inset the nipple areolar complex using 3-0 PDS sutures at the 12, 3, 6, and 9 o'clock positions.  I placed 4-0 PDS sutures between my 3-0 PDS sutures, again in deep dermal fashion.  I then ran the nipple-areolar complex vertical incision and the incision in the inframammary fold using 4-0 Monocryl suture in running and buried continuous intracuticular fashion.    Attention was then directed to the left side, where the identical operation was performed.  The Reaves pattern mastopexy was performed, and the lower ptotic portion of the Wise pattern was excised and sent to Pathology as ptotic breast tissue.  The left specimen weighed 109 grams.  Closure was identical from left to right.  Final skin closure was again performed using 4-0 Monocryl suture in running and buried continuous intracuticular fashion at the inframammary fold incision, vertical incision, and the circumareolar incision.  Dressing consisted of Vaseline over the suture lines, followed by Nitro-Bid paste and Adaptic in light of her HISTORY OF ALLERGY TO STERI-STRIPS OR ADHESIVE.  The patient was placed in a surgical bra.  Estimated blood loss from the breast lift portion of the operation was less than 30 mL.  Specimens: Right breast specimen 124 grams, left specimen 109 grams.    Attention was then directed to the rhinoplasty portion of the operation.    The bed was turned 180 degrees.  The oral JERRELL tube was taped in the midline.  The columella was injected with 1% Xylocaine with 1:100,000 epinephrine.  I then injected in transcartilaginous fashion the nasal tip and dorsum.  A headlight was used to inject the nasal septum itself.  Externally, where the facial artery crosses the nasal alar groove, I again injected with 1% Xylocaine with 1:200,000  epinephrine for additional vasoconstriction.  I then placed 4% cocaine on neurosurgical pledgets in the patient's nasal vestibule for septal vasoconstriction.  The patient received another 2 grams of Ancef and 10 mg of Decadron intravenously prior to commencing with this portion of surgery.  The eyes were protected using Lacrilube and 3M Tegaderm dressings.  The patient's face was prepped and draped in routine sterile fashion.  A transcolumellar stair-step incision was made using a 15C blade, which was connected to intranasal incisions, which followed the caudal margins of the lower lateral cartilages.  The nose was opened in a fashion typical for open septorhinoplasty.  Once the nose was opened.  I dissected using a tenotomy scissors, following the lower lateral cartilages over the dome, on to the upper lateral cartilages, and on to the nasal dorsum.  A [place] elevator was used to elevate the soft tissue off the nasal bones at the apex of the nasal dorsum.  The periosteum was left laterally to stabilize osteotomies.  Attention was then directed to the patient's nasal septum.  A [place] incision was made over the caudal margin of the septum on the patient's left side.  Mucoperichondrial flaps were easily developed over the cartilage first on the left side, back to the perpendicular plate of the ethmoid, then following the vomer and down onto the crest of the maxilla.  A 1.5 cm L-shaped strut was left over the caudal septum and dorsal septum for support.  A [place] elevator was then used to elevate the mucoperichondrium on the right side.  I freed the septum from the perpendicular plate of the ethmoid as well as the vomer and the maxilla.  The intervening piece of cartilage was removed from the patient's nose and placed in a saline-soaked sponge on the back table.  Attention was directed to the patient's nasal dorsum.  An [place] retractor was used to visualize the patient's entire nasal dorsum.  The upper lateral  cartilages were  from the patient's septum using a 15 blade.  [place] elevator was used to dissect the mucoperichondrium from the underside of the upper lateral cartilages and from the septum.  This was done to allow placement of  grafts.  The upper lateral cartilages were reduced by approximately 3.5 to 4 mm in height using a 15 blade.  To reduce the septum, I used the osteotome to remove the bony dorsal nasal hump.  All cartilage was saved for possible use in grafts and soaked in a saline-soaked sponge on the back table.     On the back table, I took the septal cartilage and fashioned  grafts, which were 3 mm in height and 2.5 cm in length.  They were sutured on each side of the septum and between the upper lateral cartilages to make a sandwich.  The tissue layers are as follows:  Upper lateral cartilage,  graft, septum,  graft, and upper lateral cartilage.   grafts were sutured in place with a total of three 4-0 PDS sutures in horizontal mattressing fashion.  Attention was then directed back to the patient's nasal tip.  A caliper was used to measure a 6 mm rim strip of lower lateral cartilage, which was left in place.  A Storz stitch scissors was used to dissect the cephalic margin of the upper lateral cartilages, and this was removed, leaving the scroll intact.  Resected cephalic margin of the lateral cartilage on both the left sides was placed in a saline-soaked sponge on the back table.    I then dissected between the footplates of the lower lateral cartilages to allow for placement of a columellar strut graft.  A hockey stick shaped columellar strut graft was then fashioned 4 mm in width with a bend to accommodate the infratip lobule angle.  I placed 25-gauge needles to secure my lower lateral cartilages to the columellar strut graft.  I then sutured the lower lateral cartilages to the columellar strut grafts using horizontal mattressing sutures of 4-0 PDS.   Three such sutures were placed, the last of which determined the angle of splay of the lower lateral cartilage.    Next, I dissected between the genu of the lower lateral cartilages, dissecting the mucosa free from the underside of the genu to allow for placement of tip-defining sutures.  Tip-defining sutures were then placed using 5-0 PDS suture in mattressing fashion.  A dome-gathering suture placed using 4-0 PDS sutures.  I then placed a lateral crural spanning suture of 4-0 PDS suture in mattressing fashion.  At this point, I rasped the dorsum to smooth the bone where the osteotomies had been performed using a mimi rasp.    Next, a Elenita tip graft was fashioned on the back table.  It was sewn in place at the tip using 5-0 PDS sutures in interrupted fashion.    At this point, I injected over the nasal bones themselves for osteotomies using 1% Xylocaine with 1:100,000 epinephrine.  Incisions were made within the piriform aperture on both right and left sides using a 15 blade.  Then, using a 4 mm osteotome, perforating cuts were made through the nasal bones approximately 2 mm apart.  This allowed for a very accurate control of the osteotomies.  I then performed what is in effect a greenstick infracture osteotomy so as not lose control of the nasal bones.  Using gentle pressure, I was able to close the open roof deformity and narrow the patient's nasal dorsum.  Again, additional rasping of the dorsum of the osteotomies was performed using a mimi rasp.  I then copiously irrigated the nose using saline containing Ancef.  Next, the transcolumellar incision was closed using 6-0 Monocryl sutures in buried interrupted intracuticular fashion.  The skin of the transcolumellar incision was closed using 6-0 Prolene suture in simple interrupted fashion.  I then performed quilting sutures of 3-0 chromic gut in a back-and-forth fashion to be able to reapproximate the mucoperichondrial flaps and obliterate all space to  decrease chances of a septal hematoma.  The septal access incisions were closed using 5-0 chromic gut suture in interrupted fashion.  All intranasal incisions were then closed using 5-0 chromic gut suture in simple interrupted fashion.  The osteotomy sites were copiously irrigated with saline containing Ancef.  The osteotomy incisions were closed using 4-0 chromic gut suture in simple interrupted fashion.  I then injected topical thrombin beneath the nasal flaps using a 20-gauge Angiocath for hemostasis.  At the end of the procedure, 0.25% Marcaine with 1:200,000 epinephrine was used to inject the bilateral infraorbital nerves and under the lip for local anesthesia.  I taped the nose using 3M micropore tape in light of the patient's history of ALLERGY TO STERI-STRIPS AND ADHESIVES.  The patient's throat pack was removed.  The patient 3M Tegaderm dressings were removed from her eyelids prior to extubation.  Estimated blood loss from the rhinoplasty portion of the operation was less than 20 mL.    COMPLICATIONS:  None.    CONDITION:  Extubated and stable to postanesthesia recovery.    Chriss Musa MD        D: 2021   T: 2021   MT: ISABELLA    Name:     MONTERROSORADHA DELGADO TOBI  MRN:      [id number]        Account:        [id number]   :      1985           Procedure Date: 2021     Document: [id number]

## 2023-05-10 ENCOUNTER — HOSPITAL ENCOUNTER (EMERGENCY)
Facility: CLINIC | Age: 38
Discharge: HOME | End: 2023-05-11
Attending: EMERGENCY MEDICINE | Admitting: EMERGENCY MEDICINE
Payer: COMMERCIAL
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
## 2023-05-10 DIAGNOSIS — S60.212A CONTUSION OF LEFT WRIST, INITIAL ENCOUNTER: ICD-10-CM

## 2023-05-10 DIAGNOSIS — S16.1XXA STRAIN OF NECK MUSCLE, INITIAL ENCOUNTER: ICD-10-CM

## 2023-05-10 PROCEDURE — 99283 EMERGENCY DEPT VISIT LOW MDM: CPT

## 2023-05-10 ASSESSMENT — ENCOUNTER SYMPTOMS
BACK PAIN: 1
NECK STIFFNESS: 1
COLOR CHANGE: 1
NECK PAIN: 1
ARTHRALGIAS: 1

## 2023-05-11 VITALS
HEART RATE: 94 BPM | TEMPERATURE: 98.2 F | DIASTOLIC BLOOD PRESSURE: 86 MMHG | WEIGHT: 142 LBS | OXYGEN SATURATION: 100 % | SYSTOLIC BLOOD PRESSURE: 128 MMHG | RESPIRATION RATE: 16 BRPM | BODY MASS INDEX: 22.24 KG/M2

## 2023-05-11 LAB
ATRIAL RATE - MUSE: 109 BPM
DIASTOLIC BLOOD PRESSURE - MUSE: NORMAL MMHG
INTERPRETATION ECG - MUSE: NORMAL
P AXIS - MUSE: 70 DEGREES
PR INTERVAL - MUSE: 170 MS
QRS DURATION - MUSE: 80 MS
QT - MUSE: 336 MS
QTC - MUSE: 452 MS
R AXIS - MUSE: 70 DEGREES
SYSTOLIC BLOOD PRESSURE - MUSE: NORMAL MMHG
T AXIS - MUSE: 47 DEGREES
VENTRICULAR RATE- MUSE: 109 BPM

## 2023-05-11 PROCEDURE — 93005 ELECTROCARDIOGRAM TRACING: CPT
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
## 2023-05-11 NOTE — DISCHARGE INSTRUCTIONS
1. -Take acetaminophen 500 to 1000 mg by mouth every 4 to 6 hours as needed for pain or fever.  Do not take more than 4000 mg in 24 hours.  Do not take within 6 hours of another acetaminophen containing medication such as norco (vicodin) or percocet.  - Take ibuprofen 600 to 800 mg by mouth every 6 to 8 hours as needed for pain or fever  2.  Please return to the emergency department as needed for new or worsening symptoms including severe and uncontrollable pain, vomiting unable to keep eating down, vomiting blood, black or bloody bowel movements, urinating blood, worsening abdominal pain, chest pain, shortness of breath, any other concerning symptoms.    Discharge Instructions  Concussion    You were seen today for signs of a concussion.  The symptoms will vary, depending on the nature of your injury and your health. You may have: headache, confusion, nausea (feel sick to your stomach), vomiting (throwing up) and problems with memory, concentrating, or sleep. You may feel dizzy, irritable, and tired. Children and teens may need help from their parents, teachers, and coaches to watch for symptoms as they recover.    Generally, every Emergency Department visit should have a follow-up clinic visit with either a primary or a specialty clinic/provider. Please follow-up as instructed by your emergency provider today.     Return to the Emergency Department if:  Your headache gets worse or you start to have a really bad headache even with the recommended treatment plan.   You feel drowsier, have growing confusion, or slurred speech.   You keep repeating yourself.   You have strange behavior or are feeling more irritable.   You have a seizure.   You vomit (throw up) more than once.   You have trouble walking.   You have weakness or numbness.  Your neck pain gets worse.   You have a loss of consciousness.   You have blood for fluid coming from your ears or nose.   You have new symptoms or anything that worries you.     Home  Care:  Get lots of rest and get enough sleep at night. Take daytime naps or rest if you feel tired.   Limit physical activity and  thinking  activities. These can make symptoms worse.   Physical activities include gym, sports, weight training, running, exercise, and heavy lifting.   Thinking activities include homework, class work, job-related work, and screen time (phone, computer, tablet, TV, and video games).   Stick to a healthy diet and drink lots of fluids. Avoid alcohol.  As symptoms improve, you may slowly return to your daily activities. If symptoms get worse or return, reduce your activity.   Know that it is normal to feel sad or frustrated when you do not feel right and are less active.     Going Back to Work:  Your care team will tell you when you are ready to return to work.    Limit the amount of work you do soon after your injury. This may speed healing. Take breaks if your symptoms get worse. You should also reduce your physical activity as well as activities that require a lot of thinking until you see your doctor. You may need shorter work days and a lighter workload.  Avoid heavy lifting, working with machinery, driving and working at heights until your symptoms are gone or you are cleared by a provider.    Going Back to School:  If you are still having symptoms, you may need extra help at school.  Tell your teachers and school nurse about your injury and symptoms. Ask them to watch for problems with learning, memory, and concentrating. Symptoms may get worse when you do schoolwork, and you may become more irritable. You may need shorter school days, a reduced workload, and to postpone testing.  Do not drive or take gym class (physical activity) until cleared by a provider.    Returning to Sports:  Never return to play if you have any symptoms. A full recovery will reduce the chances of getting hurt again. Remember, it is better to miss one or two games than a whole season.  You should rest from  all physical activity until you see your provider. Generally, if all symptoms have completely cleared, your provider can help guide you to slowly return to sports. If symptoms return or worsen, stop the activity and see your provider.  Important: If you are in an organized sport and under age 18, you will need written consent from a healthcare provider before you return to sports. Typically, this will be your primary care or sports medicine provider. Please make an appointment.    If you were given a prescription for medicine here today, be sure to read all of the information (including the package insert) that comes with your prescription.  This will include important information about the medicine, its side effects, and any warnings that you need to know about.  The pharmacist who fills the prescription can provide more information and answer questions you may have about the medicine.  If you have questions or concerns that the pharmacist cannot address, please call or return to the Emergency Department.     Remember that you can always come back to the Emergency Department if you are not able to see your regular provider in the amount of time listed above, if you get any new symptoms, or if there is anything that worries you.
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
## 2024-04-22 ENCOUNTER — NURSE TRIAGE (OUTPATIENT)
Dept: NURSING | Facility: CLINIC | Age: 39
End: 2024-04-22
Payer: COMMERCIAL

## 2024-04-22 NOTE — TELEPHONE ENCOUNTER
Reason for Disposition   Food allergies - cross reactions, questions about    Additional Information   Negative: [1] Life-threatening reaction (anaphylaxis) in the past to similar food AND [2] < 2 hours since exposure   Negative: [1] Asthma attack AND [2] abrupt onset following suspected food   Negative: Wheezing, stridor, cough, hoarseness, or difficulty breathing   Negative: Tightness/pain reported in the chest or throat   Negative: Difficulty swallowing, drooling or slurred speech (Exception: Drooling alone present before reaction, not worse and no difficulty swallowing)   Negative: Thinking or speech is confused   Negative: Unresponsive, passed out or very weak   Negative: [1] Gave epinephrine shot AND [2] no symptoms now   Negative: Sounds like a life-threatening emergency to the triager   Negative: Allergy to specific food previously diagnosed by HCP or allergist   Negative: Injectable epinephrine (such as EpiPen) previously prescribed for this patient for a food allergy   Negative: [1] Vomiting and/or diarrhea is present AND [2] age > 1 year AND [3] ate spoiled food in previous 12 hours   Negative: [1] Hives AND [2] food allergy not suspected   Negative: [1] Face swelling AND [2] food allergy not suspected   Negative: [1] Lip swelling AND [2] food allergy not suspected   Negative: [1] Eye swelling AND [2] food allergy not suspected   Negative: Hiccups are the only symptom   Negative: [1] Gave asthma inhaler or neb AND [2] no symptoms now   Negative: [1] Serious allergic reaction in the past (not life-threatening or anaphylaxis) AND [2] similar symptoms now   Negative: [1] Widespread hives or widespread itching within 2 hours of exposure to HIGH-RISK food (e.g., nuts, fish, shellfish, eggs) AND [2] NO serious symptoms or past serious allergic reaction (Exception: time of call > 2 hours since exposure)   Negative: [1] Major face swelling (entire face not just eye or lip swelling alone) within 2 hours of  exposure to HIGH-RISK food (nuts, fish, shellfish, eggs) AND [2] NO serious symptoms or past serious allergic reaction  (Exception: time of call > 2 hours since exposure)   Negative: [1] Vomiting or abdominal cramps onset within 2 hours of exposure to HIGH-RISK food (e.g., nuts, fish, shellfish, eggs) AND [2] NO serious symptoms or past serious allergic reaction (Exception: time of call > 2 hours since exposure AND symptoms resolved. See during office hours)   Negative: [1] Widespread hives AND [2] associated vomiting AND [3] onset of both symptoms within 4 hours of exposure to HIGH-RISK food (e.g., nuts, fish, shellfish, eggs) AND [4] NO serious symptoms or past serious allergic reaction   Negative: [1] Widespread hives, itching or facial swelling within 2 hours of exposure to HIGH-RISK food (e.g., nuts, fish, shellfish, eggs) BUT [2] now > 2 hours since onset AND [3] NO serious symptoms   Negative: [1] Widespread hives, itching or facial swelling AND [2] onset > 2 hours after exposure to HIGH-RISK food (e.g., nuts, fish, shellfish, eggs)   Negative: [1] Widespread hives, itching or facial swelling AND [2] onset any time after other suspected food (not HIGH-RISK food)   Negative: [1] Localized hives/rash or swelling (e.g., eyes or lips) AND [2] onset < 2 hours after exposure to HIGH-RISK food AND [3] no other symptoms   Negative: [1] Vomiting or abdominal cramps AND [2] onset 2-4 hours after exposure to HIGH-RISK food   Negative: [1] Vomiting or abdominal cramps AND [2] onset within 2 hours after exposure to other suspected food (not HIGH-RISK)   Negative: [1] Food allergy diagnosed AND [2] caller wants to re-introduce that food   Negative: [1] OAS suspected (over age 5 with itching and/or swelling of the mouth/ lips) AND [2] follows eating un-cooked fresh fruit or vegetables AND [3] diagnosis never confirmed   Negative: [1] Runny nose, watery eyes and/or reddened face AND [2] food allergy suspected AND [3]  diagnosis never confirmed (Exception: follows spicy food)   Negative: [1] Diarrhea AND [2] food allergy suspected AND [3] diagnosis never confirmed   Negative: [1] Vague symptoms (e.g., hyperactivity, fatigue) AND [2] food allergy suspected AND [3] diagnosis never confirmed   Negative: Lactose intolerance suspected (gas, bloating, abdominal cramps with milk)   Negative: Sugar (sucrose) reaction suspected (e.g., behavioral change following candy)   Negative: [1] Other mild symptoms OR symptoms resolving AND [2] food allergy suspected AND [3] diagnosis never confirmed (Exception: contact dermatitis from skin contact with food OR reaction to spicy food)   Negative: [1] Localized rash or redness around mouth AND [2] after eating suspected food (e.g., tomatoes, citrus fruit or berries)   Negative: [1] Localized hives, rash or swelling at other site AND [2] from skin contact with a food   Negative: [1] Oral Allergy Syndrome  (itching and/or swelling of the mouth/ lips) AND [2] previously diagnosed   Negative: [1] Runny nose, watery eyes and/or redness to face AND [2] follows spicy food (e.g., chili pepper)   Negative: [1] Runny nose, watery eyes and/or redness to face from non-allergic food reaction AND [2] diagnosis already confirmed   Negative: [1] Vomiting from non-allergic food reaction AND [2] diagnosis already confirmed   Negative: [1] Diarrhea from non-allergic food reaction AND [2] diagnosis already confirmed   Negative: Prevention of allergies and infant feeding, questions about   Negative: Food allergies - common causes, questions about    Protocols used: Food Reactions - General-P-AH  The patient is calling and reports she consumed a rare burger at a restaurant.  She reports she ordered a medium rare burger, but alf through consuming the burger, she realized it was more rare then her liking.  She says she spoke with the  regarding the issue.  She is concerned of food borne illness from the  rambo  She denies any symptoms of allergic reaction, nausea or vomiting, and or abdominal pain  Triage guidelines recommend to home care  Caller verbalized and understands directives

## 2024-06-08 NOTE — ANESTHESIA POSTPROCEDURE EVALUATION
Patient: Joleen Posey    Procedure(s):  RHINOPLASTY  Reaves pattern mastopexy    Diagnosis:Encounter for cosmetic surgery [Z41.1]  Diagnosis Additional Information: No value filed.    Anesthesia Type:  No value filed.    Note:  Disposition: Outpatient   Postop Pain Control: Uneventful            Sign Out: Well controlled pain   PONV: No   Neuro/Psych: Uneventful            Sign Out: Acceptable/Baseline neuro status   Airway/Respiratory: Uneventful            Sign Out: Acceptable/Baseline resp. status   CV/Hemodynamics: Uneventful            Sign Out: Acceptable CV status   Other NRE: NONE   DID A NON-ROUTINE EVENT OCCUR? No           Last vitals:  Vitals Value Taken Time   /61 08/12/21 1555   Temp 36.4  C (97.6  F) 08/12/21 1555   Pulse     Resp 18 08/12/21 1555   SpO2 100 % 08/12/21 1555       Electronically Signed By: Cedric Phillips MD  August 12, 2021  4:23 PM  
No

## 2024-09-10 ENCOUNTER — ANCILLARY PROCEDURE (OUTPATIENT)
Dept: MAMMOGRAPHY | Facility: CLINIC | Age: 39
End: 2024-09-10
Attending: SPECIALIST
Payer: COMMERCIAL

## 2024-09-10 DIAGNOSIS — Z12.31 VISIT FOR SCREENING MAMMOGRAM: ICD-10-CM

## 2024-09-10 PROCEDURE — 77067 SCR MAMMO BI INCL CAD: CPT | Mod: TC | Performed by: RADIOLOGY

## 2024-09-10 PROCEDURE — 77063 BREAST TOMOSYNTHESIS BI: CPT | Mod: TC | Performed by: RADIOLOGY

## 2024-09-22 ENCOUNTER — HEALTH MAINTENANCE LETTER (OUTPATIENT)
Age: 39
End: 2024-09-22

## (undated) DEVICE — DRSG TEGADERM 2 3/8X2 3/4" 1624W

## (undated) DEVICE — GLOVE PROTEXIS W/NEU-THERA 6.5  2D73TE65

## (undated) DEVICE — ESU ELEC BLADE 2.75" COATED/INSULATED E1455

## (undated) DEVICE — ESU PENCIL SMOKE EVAC W/ROCKER SWITCH 0703-047-000

## (undated) DEVICE — SU VICRYL 3-0 SH 27" UND J416H

## (undated) DEVICE — PACK MINOR SBA15MIFSE

## (undated) DEVICE — SYR 50ML LL W/O NDL 309653

## (undated) DEVICE — PREP CHLORAPREP 26ML TINTED ORANGE  260815

## (undated) DEVICE — STPL SKIN 35W 054887

## (undated) DEVICE — SPECIMEN CONTAINER 4OZ

## (undated) DEVICE — DRAPE BREAST/CHEST 29420

## (undated) DEVICE — DRSG GAUZE 4X4" TRAY 6939

## (undated) DEVICE — NDL 27GA 1.25" 305136

## (undated) DEVICE — SU ETHIBOND 2-0 SH 30" X833H

## (undated) DEVICE — ESU NDL COLORADO MICRO 3CM STR N103A

## (undated) DEVICE — SPONGE COTTONOID 1/2X3" 80-1407

## (undated) DEVICE — NDL 19GA 1.5"

## (undated) DEVICE — CATH TRAY FOLEY SURESTEP 16FR DRAIN BAG STATOCK A899916

## (undated) DEVICE — SOL NACL 0.9% INJ 100ML BAG 2B1307

## (undated) DEVICE — MARKER SKIN DOUBLE TIP W/FLEXI-RULER W/LABELS

## (undated) DEVICE — DECANTER BAG 2002S

## (undated) DEVICE — SU MONOCRYL 4-0 P-3 18" UND Y494G

## (undated) DEVICE — ESU ELEC BLADE 6" COATED/INSULATED E1455-6

## (undated) DEVICE — ADH LIQUID MASTISOL TOPICAL VIAL 2-3ML 0523-48

## (undated) DEVICE — GOWN XLG DISP 9545

## (undated) DEVICE — DRSG STERI STRIP 1/2X4" R1547

## (undated) DEVICE — SU MONOCRYL 3-0 PS-2 27" Y427H

## (undated) DEVICE — SU VICRYL 3-0 SH 27" J316H

## (undated) DEVICE — SYR BULB IRRIG DOVER 60 ML LATEX FREE 67000

## (undated) DEVICE — GLOVE PROTEXIS BLUE W/NEU-THERA 7.0  2D73EB70

## (undated) DEVICE — TUBING INFUSION INFILTRATION LIPOSUCTION 156" 24-6008

## (undated) DEVICE — BLADE KNIFE BEAVER MICROSHARP BLUE 7514

## (undated) DEVICE — SYR 10ML FINGER CONTROL W/O NDL 309695

## (undated) DEVICE — SUCTION CANISTER MEDIVAC LINER 1500ML W/LID 65651-515

## (undated) DEVICE — SU PROLENE 5-0 P-3 18" 8698G

## (undated) DEVICE — DRSG ADAPTIC 3X8" 6113

## (undated) DEVICE — SU MONOCRYL 5-0 P-3 18" UND Y493G

## (undated) DEVICE — SYR 10ML LL W/O NDL

## (undated) DEVICE — GLOVE PROTEXIS W/NEU-THERA 7.5  2D73TE75

## (undated) DEVICE — SPONGE LAP 18X18" 1515

## (undated) DEVICE — SYR EAR BULB 3OZ 0035830

## (undated) DEVICE — SUCTION TUBING 10' N1010

## (undated) DEVICE — APPLICATORS COTTON TIP 6" X10

## (undated) DEVICE — DRSG ABDOMINAL 07 1/2X8" 7197D

## (undated) DEVICE — SU PLAIN 4-0 SC-1 18" 1824H

## (undated) DEVICE — NDL 25GA 1.5" 305127

## (undated) DEVICE — DRAPE STERI INCISE 24X14" 1040

## (undated) DEVICE — SOL WATER IRRIG 1000ML BOTTLE 07139-09

## (undated) DEVICE — CATH TRAY FOLEY SURESTEP 16FR W/URINE MTR STATLK LF A303416A

## (undated) DEVICE — SPONGE PACK VAGINAL 2"X9

## (undated) DEVICE — DRSG STERI STRIP 1/4X3" R1541

## (undated) DEVICE — SUCTION TIP YANKAUER W/O VENT K86

## (undated) DEVICE — BLADE KNIFE SURG 15 371115

## (undated) DEVICE — DRAPE SPLIT EENT 76X124" 3X28" 9447

## (undated) DEVICE — ESU CLEANER TIP 31142717

## (undated) DEVICE — BLADE KNIFE SURG 15C 371716

## (undated) DEVICE — NDL SPINAL 25GA 3.5" QUINCKE 405180

## (undated) RX ORDER — PROPOFOL 10 MG/ML
INJECTION, EMULSION INTRAVENOUS
Status: DISPENSED
Start: 2021-08-12

## (undated) RX ORDER — CEFAZOLIN SODIUM 1 G/3ML
INJECTION, POWDER, FOR SOLUTION INTRAMUSCULAR; INTRAVENOUS
Status: DISPENSED
Start: 2019-12-09

## (undated) RX ORDER — ACETAMINOPHEN 325 MG/1
TABLET ORAL
Status: DISPENSED
Start: 2021-08-12

## (undated) RX ORDER — BALANCED SALT SOLUTION 6.4; .75; .48; .3; 3.9; 1.7 MG/ML; MG/ML; MG/ML; MG/ML; MG/ML; MG/ML
SOLUTION OPHTHALMIC
Status: DISPENSED
Start: 2021-08-12

## (undated) RX ORDER — FENTANYL CITRATE 50 UG/ML
INJECTION, SOLUTION INTRAMUSCULAR; INTRAVENOUS
Status: DISPENSED
Start: 2021-08-12

## (undated) RX ORDER — BUPIVACAINE HYDROCHLORIDE 2.5 MG/ML
INJECTION, SOLUTION INFILTRATION; PERINEURAL
Status: DISPENSED
Start: 2021-08-12

## (undated) RX ORDER — CEFAZOLIN SODIUM 1 G/3ML
INJECTION, POWDER, FOR SOLUTION INTRAMUSCULAR; INTRAVENOUS
Status: DISPENSED
Start: 2021-08-12

## (undated) RX ORDER — ONDANSETRON 2 MG/ML
INJECTION INTRAMUSCULAR; INTRAVENOUS
Status: DISPENSED
Start: 2019-12-09

## (undated) RX ORDER — PROPOFOL 10 MG/ML
INJECTION, EMULSION INTRAVENOUS
Status: DISPENSED
Start: 2019-12-09

## (undated) RX ORDER — LIDOCAINE HYDROCHLORIDE 20 MG/ML
INJECTION, SOLUTION INFILTRATION; PERINEURAL
Status: DISPENSED
Start: 2019-12-09

## (undated) RX ORDER — OXYCODONE HYDROCHLORIDE 5 MG/1
TABLET ORAL
Status: DISPENSED
Start: 2021-08-12

## (undated) RX ORDER — COCAINE HYDROCHLORIDE 40 MG/ML
SOLUTION NASAL
Status: DISPENSED
Start: 2021-08-12

## (undated) RX ORDER — MEPERIDINE HYDROCHLORIDE 25 MG/ML
INJECTION INTRAMUSCULAR; INTRAVENOUS; SUBCUTANEOUS
Status: DISPENSED
Start: 2021-08-12

## (undated) RX ORDER — LIDOCAINE HYDROCHLORIDE 20 MG/ML
INJECTION, SOLUTION INFILTRATION; PERINEURAL
Status: DISPENSED
Start: 2021-08-12

## (undated) RX ORDER — DEXAMETHASONE SODIUM PHOSPHATE 4 MG/ML
INJECTION, SOLUTION INTRA-ARTICULAR; INTRALESIONAL; INTRAMUSCULAR; INTRAVENOUS; SOFT TISSUE
Status: DISPENSED
Start: 2021-08-12

## (undated) RX ORDER — GINSENG 100 MG
CAPSULE ORAL
Status: DISPENSED
Start: 2019-12-09

## (undated) RX ORDER — EPINEPHRINE 1 MG/ML
INJECTION, SOLUTION INTRAMUSCULAR; SUBCUTANEOUS
Status: DISPENSED
Start: 2021-08-12

## (undated) RX ORDER — CEFAZOLIN SODIUM 2 G/100ML
INJECTION, SOLUTION INTRAVENOUS
Status: DISPENSED
Start: 2021-08-12

## (undated) RX ORDER — FENTANYL CITRATE 50 UG/ML
INJECTION, SOLUTION INTRAMUSCULAR; INTRAVENOUS
Status: DISPENSED
Start: 2019-12-09

## (undated) RX ORDER — LIDOCAINE HYDROCHLORIDE 10 MG/ML
INJECTION, SOLUTION EPIDURAL; INFILTRATION; INTRACAUDAL; PERINEURAL
Status: DISPENSED
Start: 2021-08-12

## (undated) RX ORDER — LIDOCAINE HYDROCHLORIDE AND EPINEPHRINE 10; 10 MG/ML; UG/ML
INJECTION, SOLUTION INFILTRATION; PERINEURAL
Status: DISPENSED
Start: 2021-08-12

## (undated) RX ORDER — GABAPENTIN 300 MG/1
CAPSULE ORAL
Status: DISPENSED
Start: 2019-12-09

## (undated) RX ORDER — ACETAMINOPHEN 325 MG/1
TABLET ORAL
Status: DISPENSED
Start: 2019-12-09

## (undated) RX ORDER — GENTAMICIN 40 MG/ML
INJECTION, SOLUTION INTRAMUSCULAR; INTRAVENOUS
Status: DISPENSED
Start: 2021-08-12

## (undated) RX ORDER — OXYCODONE HYDROCHLORIDE 5 MG/1
TABLET ORAL
Status: DISPENSED
Start: 2019-12-09

## (undated) RX ORDER — ONDANSETRON 2 MG/ML
INJECTION INTRAMUSCULAR; INTRAVENOUS
Status: DISPENSED
Start: 2021-08-12

## (undated) RX ORDER — SCOLOPAMINE TRANSDERMAL SYSTEM 1 MG/1
PATCH, EXTENDED RELEASE TRANSDERMAL
Status: DISPENSED
Start: 2021-08-12

## (undated) RX ORDER — DEXAMETHASONE SODIUM PHOSPHATE 4 MG/ML
INJECTION, SOLUTION INTRA-ARTICULAR; INTRALESIONAL; INTRAMUSCULAR; INTRAVENOUS; SOFT TISSUE
Status: DISPENSED
Start: 2019-12-09